# Patient Record
Sex: FEMALE | Race: WHITE | Employment: FULL TIME | URBAN - NONMETROPOLITAN AREA
[De-identification: names, ages, dates, MRNs, and addresses within clinical notes are randomized per-mention and may not be internally consistent; named-entity substitution may affect disease eponyms.]

---

## 2017-01-04 ENCOUNTER — OFFICE VISIT (OUTPATIENT)
Dept: FAMILY MEDICINE CLINIC | Age: 22
End: 2017-01-04

## 2017-01-04 VITALS
HEIGHT: 66 IN | RESPIRATION RATE: 15 BRPM | BODY MASS INDEX: 23.56 KG/M2 | HEART RATE: 76 BPM | DIASTOLIC BLOOD PRESSURE: 59 MMHG | WEIGHT: 146.6 LBS | SYSTOLIC BLOOD PRESSURE: 91 MMHG

## 2017-01-04 DIAGNOSIS — F07.81 POST CONCUSSION SYNDROME: Primary | ICD-10-CM

## 2017-01-04 DIAGNOSIS — G44.329 CHRONIC POST-TRAUMATIC HEADACHE, NOT INTRACTABLE: ICD-10-CM

## 2017-01-04 PROCEDURE — 99214 OFFICE O/P EST MOD 30 MIN: CPT | Performed by: FAMILY MEDICINE

## 2017-01-04 RX ORDER — TOPIRAMATE 50 MG/1
50 TABLET, FILM COATED ORAL 2 TIMES DAILY
Qty: 180 TABLET | Refills: 3 | Status: SHIPPED | OUTPATIENT
Start: 2017-01-04 | End: 2017-04-18

## 2017-01-04 ASSESSMENT — ENCOUNTER SYMPTOMS
NAUSEA: 0
WHEEZING: 0
VOMITING: 0
SHORTNESS OF BREATH: 0

## 2017-04-18 ENCOUNTER — OFFICE VISIT (OUTPATIENT)
Dept: FAMILY MEDICINE CLINIC | Age: 22
End: 2017-04-18

## 2017-04-18 VITALS
HEART RATE: 61 BPM | BODY MASS INDEX: 22.61 KG/M2 | SYSTOLIC BLOOD PRESSURE: 98 MMHG | HEIGHT: 66 IN | RESPIRATION RATE: 16 BRPM | DIASTOLIC BLOOD PRESSURE: 62 MMHG | WEIGHT: 140.7 LBS

## 2017-04-18 DIAGNOSIS — G44.329 CHRONIC POST-TRAUMATIC HEADACHE, NOT INTRACTABLE: ICD-10-CM

## 2017-04-18 DIAGNOSIS — F07.81 POST CONCUSSION SYNDROME: Primary | ICD-10-CM

## 2017-04-18 DIAGNOSIS — F41.9 ANXIETY: ICD-10-CM

## 2017-04-18 PROCEDURE — 99213 OFFICE O/P EST LOW 20 MIN: CPT | Performed by: FAMILY MEDICINE

## 2017-04-18 RX ORDER — TOPIRAMATE 100 MG/1
100 TABLET, FILM COATED ORAL 2 TIMES DAILY
Qty: 180 TABLET | Refills: 1 | Status: SHIPPED | OUTPATIENT
Start: 2017-04-18 | End: 2018-02-13

## 2017-04-18 RX ORDER — FLUOXETINE HYDROCHLORIDE 20 MG/1
20 CAPSULE ORAL DAILY
COMMUNITY
End: 2019-07-19 | Stop reason: ALTCHOICE

## 2017-04-18 RX ORDER — TIZANIDINE 4 MG/1
4 TABLET ORAL 3 TIMES DAILY
Qty: 30 TABLET | Refills: 0 | Status: SHIPPED | OUTPATIENT
Start: 2017-04-18 | End: 2018-02-13

## 2017-11-03 ENCOUNTER — HOSPITAL ENCOUNTER (EMERGENCY)
Age: 22
Discharge: HOME | End: 2017-11-03
Payer: COMMERCIAL

## 2017-11-03 ENCOUNTER — APPOINTMENT (OUTPATIENT)
Dept: GENERAL RADIOLOGY | Age: 22
End: 2017-11-03
Payer: COMMERCIAL

## 2017-11-03 VITALS
BODY MASS INDEX: 23.32 KG/M2 | RESPIRATION RATE: 20 BRPM | HEIGHT: 65 IN | SYSTOLIC BLOOD PRESSURE: 122 MMHG | HEART RATE: 92 BPM | OXYGEN SATURATION: 99 % | WEIGHT: 140 LBS | DIASTOLIC BLOOD PRESSURE: 76 MMHG | TEMPERATURE: 98.8 F

## 2017-11-03 DIAGNOSIS — J06.9 VIRAL URI WITH COUGH: ICD-10-CM

## 2017-11-03 DIAGNOSIS — J40 BRONCHITIS: Primary | ICD-10-CM

## 2017-11-03 PROCEDURE — 99285 EMERGENCY DEPT VISIT HI MDM: CPT

## 2017-11-03 PROCEDURE — 93005 ELECTROCARDIOGRAM TRACING: CPT

## 2017-11-03 PROCEDURE — 71020 XR CHEST STANDARD TWO VW: CPT

## 2017-11-03 RX ORDER — PSEUDOEPHEDRINE HYDROCHLORIDE 30 MG/1
30 TABLET ORAL EVERY 4 HOURS PRN
Qty: 20 TABLET | Refills: 1 | Status: SHIPPED | OUTPATIENT
Start: 2017-11-03 | End: 2017-11-10

## 2017-11-03 RX ORDER — PROMETHAZINE HYDROCHLORIDE AND CODEINE PHOSPHATE 6.25; 1 MG/5ML; MG/5ML
5 SYRUP ORAL 4 TIMES DAILY PRN
Qty: 140 ML | Refills: 0 | Status: SHIPPED | OUTPATIENT
Start: 2017-11-03 | End: 2017-11-10

## 2017-11-03 ASSESSMENT — PAIN SCALES - GENERAL: PAINLEVEL_OUTOF10: 8

## 2017-11-03 ASSESSMENT — ENCOUNTER SYMPTOMS
SHORTNESS OF BREATH: 1
VOMITING: 0
ABDOMINAL PAIN: 0
EYE PAIN: 0
SORE THROAT: 0
EYE DISCHARGE: 0
WHEEZING: 0
DIARRHEA: 0
BACK PAIN: 0
NAUSEA: 0
COUGH: 1
RHINORRHEA: 0

## 2017-11-03 ASSESSMENT — PAIN DESCRIPTION - PAIN TYPE: TYPE: ACUTE PAIN

## 2017-11-03 ASSESSMENT — PAIN DESCRIPTION - ONSET: ONSET: ON-GOING

## 2017-11-03 ASSESSMENT — PAIN DESCRIPTION - LOCATION: LOCATION: CHEST

## 2017-11-03 ASSESSMENT — PAIN DESCRIPTION - FREQUENCY: FREQUENCY: CONTINUOUS

## 2017-11-03 ASSESSMENT — PAIN DESCRIPTION - ORIENTATION: ORIENTATION: MID

## 2017-11-04 LAB
EKG ATRIAL RATE: 93 BPM
EKG P AXIS: 67 DEGREES
EKG P-R INTERVAL: 148 MS
EKG Q-T INTERVAL: 340 MS
EKG QRS DURATION: 78 MS
EKG QTC CALCULATION (BAZETT): 422 MS
EKG R AXIS: 77 DEGREES
EKG T AXIS: -37 DEGREES
EKG VENTRICULAR RATE: 93 BPM

## 2017-11-04 NOTE — ED PROVIDER NOTES
[facility]  eMERGENCY dEPARTMENT eNCOUnter          CHIEF COMPLAINT       Chief Complaint   Patient presents with    Chest Pain    URI     Dx bronchitis, Hard to breathe    Cough       Nurses Notes reviewed and I agree except as noted in the HPI. HISTORY OF PRESENT ILLNESS    Luz Yañez is a 23 y.o. female who presents to the Emergency Department for the evaluation of chest pain, onset 3 days ago. Her chest pain is rated an 8/10 in severity. She also has had an associated cough and shortness of breath. She was recently diagnosed with bronchitis 3 days ago and her doctor prescribed prednisone, inhaler, and a z-pack. She states her symptoms are not improving and now has chest pain. The patient denies any other symptoms at this time. REVIEW OF SYSTEMS     Review of Systems   Constitutional: Negative for appetite change, chills, fatigue and fever. HENT: Negative for congestion, ear pain, rhinorrhea and sore throat. Eyes: Negative for pain, discharge and visual disturbance. Respiratory: Positive for cough and shortness of breath. Negative for wheezing. Cardiovascular: Positive for chest pain. Negative for palpitations and leg swelling. Gastrointestinal: Negative for abdominal pain, diarrhea, nausea and vomiting. Genitourinary: Negative for difficulty urinating, dysuria and vaginal discharge. Musculoskeletal: Negative for arthralgias, back pain, joint swelling and neck pain. Skin: Negative for pallor and rash. Neurological: Negative for dizziness, syncope, weakness, light-headedness and headaches. Hematological: Negative for adenopathy. Psychiatric/Behavioral: Negative for confusion, dysphoric mood and suicidal ideas. The patient is not nervous/anxious. PAST MEDICAL HISTORY    has a past medical history of Fatigue; GERD (gastroesophageal reflux disease); Headache(784.0); Hormone disorder; and Septal defect.     SURGICAL HISTORY      has a past surgical history that includes shoulder surgery (Right); knee surgery (Right); Tonsillectomy; Knee arthroscopy; Shoulder arthroscopy; and Adenoidectomy. CURRENT MEDICATIONS       Previous Medications    ACETAMINOPHEN (APAP EXTRA STRENGTH) 500 MG TABLET    Take 2 tablets by mouth every 8 hours as needed for Pain. AMITRIPTYLINE (ELAVIL) 10 MG TABLET    Take 1 tablet by mouth nightly    BROMOCRIPTINE MESYLATE (PARLODEL PO)    Take 5 mg by mouth. FLUOXETINE (PROZAC) 20 MG CAPSULE    Take 20 mg by mouth daily    LANSOPRAZOLE (PREVACID) 30 MG CAPSULE    Take 30 mg by mouth daily. TIZANIDINE (ZANAFLEX) 4 MG TABLET    Take 1 tablet by mouth 3 times daily    TOPIRAMATE (TOPAMAX) 100 MG TABLET    Take 1 tablet by mouth 2 times daily       ALLERGIES     is allergic to latex and seasonal.    FAMILY HISTORY     indicated that the status of her maternal grandmother is unknown. She indicated that the status of her maternal grandfather is unknown. She indicated that the status of her paternal grandmother is unknown. She indicated that the status of her other is unknown.    family history includes Cancer in her maternal grandfather; Depression in her maternal grandmother; Diabetes in her maternal grandmother; High Blood Pressure in her paternal grandmother; Migraines in her paternal grandmother; Stroke in an other family member. SOCIAL HISTORY      reports that she has never smoked. She has never used smokeless tobacco. She reports that she does not drink alcohol or use drugs. PHYSICAL EXAM     INITIAL VITALS:  height is 5' 5\" (1.651 m) and weight is 140 lb (63.5 kg). Her oral temperature is 98.8 °F (37.1 °C). Her blood pressure is 122/76 and her pulse is 92. Her respiration is 20 and oxygen saturation is 99%. Physical Exam   Constitutional: She is oriented to person, place, and time. She appears well-developed and well-nourished. HENT:   Head: Normocephalic and atraumatic.    Right Ear: External ear normal.   Left Ear: External ear normal.   Eyes: Conjunctivae are normal. Right eye exhibits no discharge. Left eye exhibits no discharge. No scleral icterus. Neck: Normal range of motion. Neck supple. No JVD present. Mild erythema to the posterior pharynx. Prominent mucosa drainage down her posterior pharynx. Cardiovascular: Normal rate, regular rhythm and normal heart sounds. Exam reveals no gallop and no friction rub. No murmur heard. Pulmonary/Chest: Effort normal. No respiratory distress. She has no decreased breath sounds. She has no wheezes. She has rhonchi in the left upper field, the left middle field and the left lower field. She has no rales. Abdominal: Soft. She exhibits no distension. There is no tenderness. There is no rebound and no guarding. Musculoskeletal: Normal range of motion. She exhibits no edema. Neurological: She is alert and oriented to person, place, and time. She exhibits normal muscle tone. She displays no seizure activity. GCS eye subscore is 4. GCS verbal subscore is 5. GCS motor subscore is 6. Skin: Skin is warm and dry. No rash noted. She is not diaphoretic. Psychiatric: She has a normal mood and affect. Her behavior is normal. Thought content normal.   Nursing note and vitals reviewed. DIFFERENTIAL DIAGNOSIS:   Including but not limited to: viral URI, pneumothorax, pleurisy, bacterial bronchitis, or viral bronchitis. DIAGNOSTIC RESULTS     EKG: All EKG's are interpreted by the Emergency Department Physician who either signs or Co-signs this chart in the absence of a cardiologist.  EKG read and interpreted by myself with comparison to 26-APR-2013 gives impression of normal sinus rhythm with heart rate of 93; interval 148; QRS 78;QTc 422; axis 67, 77, -37.    Normal sinus rhythm  Possible Left atrial enlargement  T wave abnormality, consider inferior ischemia  T wave abnormality, consider anterior ischemia  No STEMI    RADIOLOGY: non-plain film images(s) such as CT, Ultrasound and MRI are read by the radiologist.  XR CHEST STANDARD (2 VW)   Final Result      No acute cardiopulmonary disease. **This report has been created using voice recognition software. It may contain minor errors which are inherent in voice recognition technology. **      Final report electronically signed by Dr. Ronnie Elliott on 11/3/2017 9:03 PM        LABS:     Labs Reviewed - No data to display    EMERGENCY DEPARTMENT COURSE:   Vitals:    Vitals:    11/03/17 2039   BP: 122/76   Pulse: 92   Resp: 20   Temp: 98.8 °F (37.1 °C)   TempSrc: Oral   SpO2: 99%   Weight: 140 lb (63.5 kg)   Height: 5' 5\" (1.651 m)       8:38 PM: The patient was seen and evaluated in a timely fashion. The patient was seen and evaluated within the ED today following chest pain. Within the department, I observed the patient's vital signs to be within acceptable range. On exam, I appreciated mild erythema to her posterior pharynx and prominent mucosa drainage down her posterior pharynx. Radiological studies within the department revealed no acute findings. I observed the patient's condition to remain stable during the duration of their stay. I explained my proposed course of treatment to the patient, and they were amenable to my decision. They will return to the ED if their symptoms become more severe in nature, or otherwise change. Medications - No data to display    CRITICAL CARE:   None. CONSULTS:   None. PROCEDURES:  None. FINAL IMPRESSION      1. Bronchitis    2. Viral URI with cough          DISPOSITION/PLAN     Discharged. I have given the patient strict written and verbal instructions about care at home, follow-up, and signs and symptoms of worsening of condition and they did verbalize understanding.     PATIENT REFERRED TO:  [id number] [facility] [id number]            DISCHARGE MEDICATIONS:  New Prescriptions    PROMETHAZINE-CODEINE (PHENERGAN WITH CODEINE) 6.25-10 MG/5ML SYRUP    Take

## 2018-02-13 ENCOUNTER — OFFICE VISIT (OUTPATIENT)
Dept: SURGERY | Age: 23
End: 2018-02-13
Payer: COMMERCIAL

## 2018-02-13 VITALS
WEIGHT: 144.5 LBS | OXYGEN SATURATION: 96 % | HEIGHT: 66 IN | DIASTOLIC BLOOD PRESSURE: 64 MMHG | HEART RATE: 64 BPM | SYSTOLIC BLOOD PRESSURE: 122 MMHG | RESPIRATION RATE: 18 BRPM | BODY MASS INDEX: 23.22 KG/M2 | TEMPERATURE: 99 F

## 2018-02-13 DIAGNOSIS — K21.9 GASTROESOPHAGEAL REFLUX DISEASE, ESOPHAGITIS PRESENCE NOT SPECIFIED: ICD-10-CM

## 2018-02-13 DIAGNOSIS — R10.11 RUQ PAIN: Primary | ICD-10-CM

## 2018-02-13 PROCEDURE — 99243 OFF/OP CNSLTJ NEW/EST LOW 30: CPT | Performed by: SURGERY

## 2018-02-13 RX ORDER — M-VIT,TX,IRON,MINS/CALC/FOLIC 27MG-0.4MG
1 TABLET ORAL DAILY
COMMUNITY

## 2018-02-13 ASSESSMENT — ENCOUNTER SYMPTOMS
VOICE CHANGE: 0
RHINORRHEA: 0
ANAL BLEEDING: 0
EYE DISCHARGE: 0
CHEST TIGHTNESS: 0
EYE ITCHING: 0
CONSTIPATION: 0
SORE THROAT: 0
FACIAL SWELLING: 0
COLOR CHANGE: 0
PHOTOPHOBIA: 0
COUGH: 0
SINUS PRESSURE: 0
VOMITING: 1
RECTAL PAIN: 0
ABDOMINAL DISTENTION: 0
ABDOMINAL PAIN: 1
BACK PAIN: 0
WHEEZING: 0
DIARRHEA: 0
EYE PAIN: 0
TROUBLE SWALLOWING: 0
STRIDOR: 0
SHORTNESS OF BREATH: 0
EYE REDNESS: 0
APNEA: 0
BLOOD IN STOOL: 0
CHOKING: 0
NAUSEA: 1

## 2018-02-13 NOTE — LETTER
[facility]  Marlyn Coates. Mandeep Crowley  Phone- [phone]  Fax [phone]    Pt Name: Ede Caldwell Record Number: [id number]  Date of Birth 1995   Today's Date: 2/13/2018    Richard Lau was seen in consultation in the office today. My assessment and plans are listed below. ASSESSMENT        1. RUQ pain  NM Carminaa HAYLEY Ckk Kinevac   2. Gastroesophageal reflux disease, esophagitis presence not specified       Past Medical History:   Diagnosis Date    Fatigue     GERD (gastroesophageal reflux disease)     Headache(784.0)     Hormone disorder     over active prolactin hormone    Septal defect      PLANS:       Trial of Prilosec  Orders Placed This Encounter   Procedures    NM Hida W Ckk Kinevac   Follow up after testing completed   If I can provide any additional assistance or you have any concerns, please feel free to contact me. Thank you for allowing to participate in the care of your patients. Sincerely,      Marlyn Coates.  Mandeep Nelson

## 2018-02-13 NOTE — PROGRESS NOTES
Subjective:      Patient ID: Chriss Brar is a 23 y.o. female. Chief Complaint   Patient presents with    Surgical Consult     New Pt. Gall Bladder Disease- okay per Omar       HPI    Review of Systems   Constitutional: Negative for activity change, appetite change, chills, diaphoresis, fatigue, fever and unexpected weight change. HENT: Negative for congestion, dental problem, drooling, ear discharge, ear pain, facial swelling, hearing loss, mouth sores, nosebleeds, postnasal drip, rhinorrhea, sinus pressure, sneezing, sore throat, tinnitus, trouble swallowing and voice change. Eyes: Negative for photophobia, pain, discharge, redness, itching and visual disturbance. Respiratory: Negative for apnea, cough, choking, chest tightness, shortness of breath, wheezing and stridor. Cardiovascular: Negative for chest pain, palpitations and leg swelling. Gastrointestinal: Positive for abdominal pain, nausea and vomiting. Negative for abdominal distention, anal bleeding, blood in stool, constipation, diarrhea and rectal pain. Endocrine: Negative for cold intolerance, heat intolerance, polydipsia, polyphagia and polyuria. Genitourinary: Negative for decreased urine volume, difficulty urinating, dysuria, enuresis, flank pain, frequency, genital sores, hematuria and urgency. Musculoskeletal: Negative for arthralgias, back pain, gait problem, joint swelling, myalgias, neck pain and neck stiffness. Skin: Negative for color change, pallor, rash and wound. Allergic/Immunologic: Negative for environmental allergies, food allergies and immunocompromised state. Neurological: Negative for dizziness, tremors, seizures, syncope, facial asymmetry, speech difficulty, weakness, light-headedness, numbness and headaches. Hematological: Negative for adenopathy. Does not bruise/bleed easily.    Psychiatric/Behavioral: Negative for agitation, behavioral problems, confusion, decreased concentration, dysphoric mood,

## 2018-02-13 NOTE — PATIENT INSTRUCTIONS
hida scan scheduled for Feb 23rd arrive at 1230pm. Main radiology- 1st floor. NPO 6 hours prior. No pain medication.

## 2018-02-26 ENCOUNTER — HOSPITAL ENCOUNTER (OUTPATIENT)
Dept: NUCLEAR MEDICINE | Age: 23
Discharge: HOME | End: 2018-02-26
Payer: COMMERCIAL

## 2018-02-26 VITALS — HEIGHT: 66 IN | WEIGHT: 140 LBS | BODY MASS INDEX: 22.5 KG/M2

## 2018-02-26 DIAGNOSIS — R10.11 ABDOMINAL PAIN, RIGHT UPPER QUADRANT: ICD-10-CM

## 2018-02-26 PROCEDURE — A9537 TC99M MEBROFENIN: HCPCS | Performed by: SURGERY

## 2018-02-26 PROCEDURE — 2580000003 HC RX 258: Performed by: SURGERY

## 2018-02-26 PROCEDURE — 3430000000 HC RX DIAGNOSTIC RADIOPHARMACEUTICAL: Performed by: SURGERY

## 2018-02-26 PROCEDURE — 6360000004 HC RX CONTRAST MEDICATION: Performed by: SURGERY

## 2018-02-26 PROCEDURE — 78227 HEPATOBIL SYST IMAGE W/DRUG: CPT

## 2018-02-26 RX ADMIN — SODIUM CHLORIDE 1.27 MCG: 9 INJECTION, SOLUTION INTRAVENOUS at 07:38

## 2018-02-26 RX ADMIN — Medication 8.1 MILLICURIE: at 07:38

## 2018-02-26 NOTE — PROGRESS NOTES
[facility]  Established Patient Evaluation in Office  Pt Name: Ash Walker  Date of Birth 1995   Today's Date: 2/27/2018  Medical Record Number: [id number]  Referring Provider: No ref. provider found  Primary Care Provider: BRAN Stover  Chief Complaint   Patient presents with    Results     HIDA Scan 2/26/18     ASSESSMENT      1. RUQ pain  LAPAROSCOPY CHOLECYSTECTOMY    Hepatic Function Panel   2. Gastroesophageal reflux disease, esophagitis presence not specified  LAPAROSCOPY CHOLECYSTECTOMY    Hepatic Function Panel   3. Chronic cholecystitis  LAPAROSCOPY CHOLECYSTECTOMY    Hepatic Function Panel     Past Medical History:   Diagnosis Date    Fatigue     GERD (gastroesophageal reflux disease)     Headache(784.0)     Hormone disorder     over active prolactin hormone    Septal defect       PLANS       1. Mardella Oppenheim had no improvement with trial of different antacids  2. CCK with HIDA did reproduce her symptoms  3. Schedule Yajaira for L/S cholecystectomy possible open  4. The risks, options and alternatives were discussed at length with the patient. The risks including bleeding, infection, reoperation, bile duct injury and possible conversion to an open procedure were covered as well as nonresolution of pain. The possibility of other unforeseen complications including bile leak were also mentioned. Patient was made aware of intraoperative complications such as other organ injury or injury to surrounding structures. We also discussed the conduct of the operation, probable outpatient stay postoperatively and the typical post operative recovery and restrictions. The patients questions were answered. After this discussion, the patient would like to proceed with cholecystectomy. 5. Status: Outpatient  6. Planned anesthesia: general  7.  She will undergo pre-operative clearance per anesthesia guidelines with risk factors listed under the past medical history facility-administered medications for this visit. Allergies  is allergic to latex; seasonal; and chloraprep one step [chlorhexidine gluconate]. Family History  family history includes Cancer in her maternal grandfather; Depression in her maternal grandmother; Diabetes in her maternal grandmother; High Blood Pressure in her paternal grandmother; Migraines in her paternal grandmother; Stroke in an other family member. Social History   reports that she has never smoked. She has never used smokeless tobacco. She reports that she does not drink alcohol or use drugs. Health Screening Exams  Health Maintenance   Topic Date Due    HIV screen  06/03/2012    Meningococcal (MCV) Vaccine Age 0-22 Years (1 of 1) 06/03/2013    Chlamydia screen  06/03/2013    DTaP/Tdap/Td vaccine (1 - Tdap) 06/03/2016    Flu vaccine (1) 09/01/2017     Review of Systems  Constitutional: Negative for activity change, appetite change, chills, diaphoresis, fatigue, fever and unexpected weight change. HENT: Negative for congestion, dental problem, drooling, ear discharge, ear pain, facial swelling, hearing loss, mouth sores, nosebleeds, postnasal drip, rhinorrhea, sinus pressure, sneezing, sore throat, tinnitus, trouble swallowing and voice change. Eyes: Negative for photophobia, pain, discharge, redness, itching and visual disturbance. Respiratory: Negative for apnea, cough, choking, chest tightness, shortness of breath, wheezing and stridor. Cardiovascular: Negative for chest pain, palpitations and leg swelling. Gastrointestinal: Positive for abdominal pain, nausea and vomiting. Negative for abdominal distention, anal bleeding, blood in stool, constipation, diarrhea and rectal pain. Endocrine: Negative for cold intolerance, heat intolerance, polydipsia, polyphagia and polyuria.    Genitourinary: Negative for decreased urine volume, difficulty urinating, dysuria, enuresis, flank pain, frequency, genital sores, hematuria and

## 2018-02-27 ENCOUNTER — OFFICE VISIT (OUTPATIENT)
Dept: SURGERY | Age: 23
End: 2018-02-27
Payer: COMMERCIAL

## 2018-02-27 VITALS
DIASTOLIC BLOOD PRESSURE: 68 MMHG | SYSTOLIC BLOOD PRESSURE: 106 MMHG | TEMPERATURE: 97.8 F | RESPIRATION RATE: 16 BRPM | HEART RATE: 76 BPM

## 2018-02-27 DIAGNOSIS — R10.11 RUQ PAIN: Primary | ICD-10-CM

## 2018-02-27 DIAGNOSIS — K21.9 GASTROESOPHAGEAL REFLUX DISEASE, ESOPHAGITIS PRESENCE NOT SPECIFIED: ICD-10-CM

## 2018-02-27 DIAGNOSIS — K81.1 CHRONIC CHOLECYSTITIS: ICD-10-CM

## 2018-02-27 PROCEDURE — 99214 OFFICE O/P EST MOD 30 MIN: CPT | Performed by: SURGERY

## 2018-02-27 NOTE — LETTER
If I can provide any additional assistance or you have any concerns, please feel free to contact me. Thank you for allowing to participate in the care of your patients. Sincerely,      Karl Staples.  Mandeep Nelson

## 2018-02-27 NOTE — PATIENT INSTRUCTIONS
Patient Education        Cholecystectomy: Before Your Surgery  What is cholecystectomy? Cholecystectomy (jy-ppr-boe-KATIE-tuh-shiv) is a type of surgery. It removes a diseased gallbladder. This surgery is usually done as a laparoscopic surgery. The doctor puts a lighted tube and other surgical tools through small cuts (incisions) in your belly. The tube is called a scope. It lets your doctor see your organs so he or she can do the surgery. The incisions leave scars that fade with time. Most people go home the same day. You probably will feel better each day. Most people have only a small amount of pain after 1 week. If you have a desk job, you can probably go back to work in 1 to 2 weeks. If you lift heavy objects or have a very active job, it may take up to 4 weeks. In some cases, open surgery is the best choice. Your doctor may choose open surgery in advance. Or he or she may choose it in the middle of laparoscopic surgery. In open surgery, the doctor makes a larger incision in your upper belly. If you have open surgery, you will probably stay in the hospital for 2 to 4 days. And it may take 4 to 6 weeks to get back to your normal routine. Follow-up care is a key part of your treatment and safety. Be sure to make and go to all appointments, and call your doctor if you are having problems. It's also a good idea to know your test results and keep a list of the medicines you take. What happens before surgery? ?Surgery can be stressful. This information will help you understand what you can expect. And it will help you safely prepare for surgery. ? Preparing for surgery  ? · Understand exactly what surgery is planned, along with the risks, benefits, and other options. · Tell your doctors ALL the medicines, vitamins, supplements, and herbal remedies you take. Some of these can increase the risk of bleeding or interact with anesthesia. ?  · If you take blood thinners, such as warfarin (Coumadin), clopidogrel

## 2018-02-28 LAB
ALBUMIN SERPL-MCNC: 4.7 G/DL (ref 3.5–5.2)
ALK PHOSPHATASE: 43 U/L (ref 38–148)
ALT SERPL-CCNC: 10 U/L (ref 5–40)
AST SERPL-CCNC: 20 U/L (ref 9–40)
BILIRUB SERPL-MCNC: 0.2 MG/DL
BILIRUBIN DIRECT: <0.2 MG/DL
TOTAL PROTEIN: 6.9 G/DL (ref 6.1–8.3)

## 2018-03-02 ENCOUNTER — TELEPHONE (OUTPATIENT)
Dept: SURGERY | Age: 23
End: 2018-03-02

## 2018-03-08 ENCOUNTER — ANESTHESIA EVENT (OUTPATIENT)
Dept: OPERATING ROOM | Age: 23
DRG: 417 | End: 2018-03-08
Payer: COMMERCIAL

## 2018-03-08 ENCOUNTER — APPOINTMENT (OUTPATIENT)
Dept: GENERAL RADIOLOGY | Age: 23
DRG: 417 | End: 2018-03-08
Attending: SURGERY
Payer: COMMERCIAL

## 2018-03-08 ENCOUNTER — ANESTHESIA (OUTPATIENT)
Dept: OPERATING ROOM | Age: 23
DRG: 417 | End: 2018-03-08
Payer: COMMERCIAL

## 2018-03-08 ENCOUNTER — HOSPITAL ENCOUNTER (INPATIENT)
Age: 23
LOS: 2 days | Discharge: HOME | DRG: 417 | End: 2018-03-10
Attending: SURGERY | Admitting: SURGERY
Payer: COMMERCIAL

## 2018-03-08 VITALS
SYSTOLIC BLOOD PRESSURE: 95 MMHG | TEMPERATURE: 97.5 F | RESPIRATION RATE: 11 BRPM | OXYGEN SATURATION: 95 % | DIASTOLIC BLOOD PRESSURE: 51 MMHG

## 2018-03-08 DIAGNOSIS — G89.18 ACUTE POSTOPERATIVE PAIN: Primary | ICD-10-CM

## 2018-03-08 PROBLEM — R09.02 HYPOXIA: Status: ACTIVE | Noted: 2018-03-08

## 2018-03-08 LAB
BASOPHILS # BLD: 0.1 %
BASOPHILS ABSOLUTE: 0 THOU/MM3 (ref 0–0.1)
EOSINOPHIL # BLD: 0 %
EOSINOPHILS ABSOLUTE: 0 THOU/MM3 (ref 0–0.4)
HCT VFR BLD CALC: 35.3 % (ref 37–47)
HEMOGLOBIN: 12.1 GM/DL (ref 12–16)
LYMPHOCYTES # BLD: 5.8 %
LYMPHOCYTES ABSOLUTE: 0.7 THOU/MM3 (ref 1–4.8)
MCH RBC QN AUTO: 30.7 PG (ref 27–31)
MCHC RBC AUTO-ENTMCNC: 34.1 GM/DL (ref 33–37)
MCV RBC AUTO: 89.8 FL (ref 81–99)
MONOCYTES # BLD: 2.1 %
MONOCYTES ABSOLUTE: 0.3 THOU/MM3 (ref 0.4–1.3)
NUCLEATED RED BLOOD CELLS: 0 /100 WBC
PDW BLD-RTO: 12.5 % (ref 11.5–14.5)
PLATELET # BLD: 223 THOU/MM3 (ref 130–400)
PMV BLD AUTO: 7.4 FL (ref 7.4–10.4)
PREGNANCY, URINE: NEGATIVE
RBC # BLD: 3.93 MILL/MM3 (ref 4.2–5.4)
SEG NEUTROPHILS: 92 %
SEGMENTED NEUTROPHILS ABSOLUTE COUNT: 11.3 THOU/MM3 (ref 1.8–7.7)
WBC # BLD: 12.3 THOU/MM3 (ref 4.8–10.8)

## 2018-03-08 PROCEDURE — 3600000003 HC SURGERY LEVEL 3 BASE: Performed by: SURGERY

## 2018-03-08 PROCEDURE — 2500000003 HC RX 250 WO HCPCS: Performed by: NURSE ANESTHETIST, CERTIFIED REGISTERED

## 2018-03-08 PROCEDURE — 6360000002 HC RX W HCPCS: Performed by: ANESTHESIOLOGY

## 2018-03-08 PROCEDURE — 71045 X-RAY EXAM CHEST 1 VIEW: CPT

## 2018-03-08 PROCEDURE — G0378 HOSPITAL OBSERVATION PER HR: HCPCS

## 2018-03-08 PROCEDURE — 6360000002 HC RX W HCPCS: Performed by: SURGERY

## 2018-03-08 PROCEDURE — 0FT44ZZ RESECTION OF GALLBLADDER, PERCUTANEOUS ENDOSCOPIC APPROACH: ICD-10-PCS | Performed by: SURGERY

## 2018-03-08 PROCEDURE — 2500000003 HC RX 250 WO HCPCS: Performed by: SURGERY

## 2018-03-08 PROCEDURE — 3700000000 HC ANESTHESIA ATTENDED CARE: Performed by: SURGERY

## 2018-03-08 PROCEDURE — 7100000011 HC PHASE II RECOVERY - ADDTL 15 MIN: Performed by: SURGERY

## 2018-03-08 PROCEDURE — 6360000002 HC RX W HCPCS: Performed by: NURSE ANESTHETIST, CERTIFIED REGISTERED

## 2018-03-08 PROCEDURE — 3600000013 HC SURGERY LEVEL 3 ADDTL 15MIN: Performed by: SURGERY

## 2018-03-08 PROCEDURE — 6370000000 HC RX 637 (ALT 250 FOR IP): Performed by: ANESTHESIOLOGY

## 2018-03-08 PROCEDURE — 3700000001 HC ADD 15 MINUTES (ANESTHESIA): Performed by: SURGERY

## 2018-03-08 PROCEDURE — 7100000010 HC PHASE II RECOVERY - FIRST 15 MIN: Performed by: SURGERY

## 2018-03-08 PROCEDURE — 7100000000 HC PACU RECOVERY - FIRST 15 MIN: Performed by: SURGERY

## 2018-03-08 PROCEDURE — 47562 LAPAROSCOPIC CHOLECYSTECTOMY: CPT | Performed by: SURGERY

## 2018-03-08 PROCEDURE — 6360000002 HC RX W HCPCS

## 2018-03-08 PROCEDURE — 6370000000 HC RX 637 (ALT 250 FOR IP): Performed by: SURGERY

## 2018-03-08 PROCEDURE — 2580000003 HC RX 258: Performed by: SURGERY

## 2018-03-08 PROCEDURE — 85025 COMPLETE CBC W/AUTO DIFF WBC: CPT

## 2018-03-08 PROCEDURE — 36415 COLL VENOUS BLD VENIPUNCTURE: CPT

## 2018-03-08 PROCEDURE — 1200000003 HC TELEMETRY R&B

## 2018-03-08 PROCEDURE — 88304 TISSUE EXAM BY PATHOLOGIST: CPT

## 2018-03-08 PROCEDURE — 81025 URINE PREGNANCY TEST: CPT

## 2018-03-08 PROCEDURE — 7100000001 HC PACU RECOVERY - ADDTL 15 MIN: Performed by: SURGERY

## 2018-03-08 RX ORDER — MEPERIDINE HYDROCHLORIDE 25 MG/ML
12.5 INJECTION INTRAMUSCULAR; INTRAVENOUS; SUBCUTANEOUS EVERY 5 MIN PRN
Status: DISCONTINUED | OUTPATIENT
Start: 2018-03-08 | End: 2018-03-08 | Stop reason: HOSPADM

## 2018-03-08 RX ORDER — ROCURONIUM BROMIDE 10 MG/ML
INJECTION, SOLUTION INTRAVENOUS PRN
Status: DISCONTINUED | OUTPATIENT
Start: 2018-03-08 | End: 2018-03-08 | Stop reason: SDUPTHER

## 2018-03-08 RX ORDER — NEOSTIGMINE METHYLSULFATE 5 MG/5 ML
SYRINGE (ML) INTRAVENOUS PRN
Status: DISCONTINUED | OUTPATIENT
Start: 2018-03-08 | End: 2018-03-08 | Stop reason: SDUPTHER

## 2018-03-08 RX ORDER — SUCCINYLCHOLINE CHLORIDE 20 MG/ML
INJECTION INTRAMUSCULAR; INTRAVENOUS PRN
Status: DISCONTINUED | OUTPATIENT
Start: 2018-03-08 | End: 2018-03-08 | Stop reason: SDUPTHER

## 2018-03-08 RX ORDER — LIDOCAINE HYDROCHLORIDE 20 MG/ML
INJECTION, SOLUTION EPIDURAL; INFILTRATION; INTRACAUDAL; PERINEURAL PRN
Status: DISCONTINUED | OUTPATIENT
Start: 2018-03-08 | End: 2018-03-08 | Stop reason: SDUPTHER

## 2018-03-08 RX ORDER — PROMETHAZINE HYDROCHLORIDE 12.5 MG/1
12.5 TABLET ORAL EVERY 6 HOURS PRN
Qty: 20 TABLET | Refills: 0 | Status: SHIPPED | OUTPATIENT
Start: 2018-03-08 | End: 2018-03-15

## 2018-03-08 RX ORDER — MIDAZOLAM HYDROCHLORIDE 1 MG/ML
INJECTION INTRAMUSCULAR; INTRAVENOUS PRN
Status: DISCONTINUED | OUTPATIENT
Start: 2018-03-08 | End: 2018-03-08 | Stop reason: SDUPTHER

## 2018-03-08 RX ORDER — HYDROCODONE BITARTRATE AND ACETAMINOPHEN 5; 325 MG/1; MG/1
1 TABLET ORAL EVERY 4 HOURS PRN
Status: DISCONTINUED | OUTPATIENT
Start: 2018-03-08 | End: 2018-03-10 | Stop reason: HOSPADM

## 2018-03-08 RX ORDER — ONDANSETRON 2 MG/ML
4 INJECTION INTRAMUSCULAR; INTRAVENOUS EVERY 6 HOURS PRN
Status: DISCONTINUED | OUTPATIENT
Start: 2018-03-08 | End: 2018-03-10 | Stop reason: HOSPADM

## 2018-03-08 RX ORDER — KETOROLAC TROMETHAMINE 30 MG/ML
INJECTION, SOLUTION INTRAMUSCULAR; INTRAVENOUS
Status: COMPLETED
Start: 2018-03-08 | End: 2018-03-08

## 2018-03-08 RX ORDER — PROPOFOL 10 MG/ML
INJECTION, EMULSION INTRAVENOUS PRN
Status: DISCONTINUED | OUTPATIENT
Start: 2018-03-08 | End: 2018-03-08 | Stop reason: SDUPTHER

## 2018-03-08 RX ORDER — HYDROCODONE BITARTRATE AND ACETAMINOPHEN 5; 325 MG/1; MG/1
2 TABLET ORAL EVERY 4 HOURS PRN
Status: DISCONTINUED | OUTPATIENT
Start: 2018-03-08 | End: 2018-03-10 | Stop reason: HOSPADM

## 2018-03-08 RX ORDER — ONDANSETRON 2 MG/ML
INJECTION INTRAMUSCULAR; INTRAVENOUS PRN
Status: DISCONTINUED | OUTPATIENT
Start: 2018-03-08 | End: 2018-03-08 | Stop reason: SDUPTHER

## 2018-03-08 RX ORDER — FENTANYL CITRATE 50 UG/ML
50 INJECTION, SOLUTION INTRAMUSCULAR; INTRAVENOUS EVERY 5 MIN PRN
Status: DISCONTINUED | OUTPATIENT
Start: 2018-03-08 | End: 2018-03-08

## 2018-03-08 RX ORDER — DEXAMETHASONE SODIUM PHOSPHATE 4 MG/ML
INJECTION, SOLUTION INTRA-ARTICULAR; INTRALESIONAL; INTRAMUSCULAR; INTRAVENOUS; SOFT TISSUE PRN
Status: DISCONTINUED | OUTPATIENT
Start: 2018-03-08 | End: 2018-03-08 | Stop reason: SDUPTHER

## 2018-03-08 RX ORDER — KETOROLAC TROMETHAMINE 30 MG/ML
30 INJECTION, SOLUTION INTRAMUSCULAR; INTRAVENOUS EVERY 6 HOURS PRN
Status: DISCONTINUED | OUTPATIENT
Start: 2018-03-08 | End: 2018-03-10 | Stop reason: HOSPADM

## 2018-03-08 RX ORDER — KETAMINE HYDROCHLORIDE 50 MG/ML
INJECTION, SOLUTION, CONCENTRATE INTRAMUSCULAR; INTRAVENOUS PRN
Status: DISCONTINUED | OUTPATIENT
Start: 2018-03-08 | End: 2018-03-08 | Stop reason: SDUPTHER

## 2018-03-08 RX ORDER — BUPIVACAINE HYDROCHLORIDE AND EPINEPHRINE 5; 5 MG/ML; UG/ML
INJECTION, SOLUTION EPIDURAL; INTRACAUDAL; PERINEURAL PRN
Status: DISCONTINUED | OUTPATIENT
Start: 2018-03-08 | End: 2018-03-08

## 2018-03-08 RX ORDER — GLYCOPYRROLATE 1 MG/5 ML
SYRINGE (ML) INTRAVENOUS PRN
Status: DISCONTINUED | OUTPATIENT
Start: 2018-03-08 | End: 2018-03-08 | Stop reason: SDUPTHER

## 2018-03-08 RX ORDER — RANITIDINE 150 MG/1
150 TABLET ORAL DAILY PRN
COMMUNITY
End: 2019-05-03 | Stop reason: ALTCHOICE

## 2018-03-08 RX ORDER — FENTANYL CITRATE 50 UG/ML
INJECTION, SOLUTION INTRAMUSCULAR; INTRAVENOUS PRN
Status: DISCONTINUED | OUTPATIENT
Start: 2018-03-08 | End: 2018-03-08 | Stop reason: SDUPTHER

## 2018-03-08 RX ORDER — SODIUM CHLORIDE 0.9 % (FLUSH) 0.9 %
10 SYRINGE (ML) INJECTION PRN
Status: DISCONTINUED | OUTPATIENT
Start: 2018-03-08 | End: 2018-03-08 | Stop reason: HOSPADM

## 2018-03-08 RX ORDER — CALCIUM CARBONATE 200(500)MG
1 TABLET,CHEWABLE ORAL
COMMUNITY
End: 2021-10-13

## 2018-03-08 RX ORDER — ONDANSETRON 2 MG/ML
4 INJECTION INTRAMUSCULAR; INTRAVENOUS
Status: DISCONTINUED | OUTPATIENT
Start: 2018-03-08 | End: 2018-03-08 | Stop reason: HOSPADM

## 2018-03-08 RX ORDER — SODIUM CHLORIDE 0.9 % (FLUSH) 0.9 %
10 SYRINGE (ML) INJECTION EVERY 12 HOURS SCHEDULED
Status: DISCONTINUED | OUTPATIENT
Start: 2018-03-08 | End: 2018-03-08 | Stop reason: HOSPADM

## 2018-03-08 RX ORDER — KETOROLAC TROMETHAMINE 30 MG/ML
30 INJECTION, SOLUTION INTRAMUSCULAR; INTRAVENOUS ONCE
Status: COMPLETED | OUTPATIENT
Start: 2018-03-08 | End: 2018-03-08

## 2018-03-08 RX ORDER — HYDROCODONE BITARTRATE AND ACETAMINOPHEN 5; 325 MG/1; MG/1
1 TABLET ORAL EVERY 4 HOURS PRN
Qty: 40 TABLET | Refills: 0 | Status: SHIPPED | OUTPATIENT
Start: 2018-03-08 | End: 2018-03-15

## 2018-03-08 RX ORDER — SODIUM CHLORIDE 9 MG/ML
INJECTION, SOLUTION INTRAVENOUS CONTINUOUS
Status: DISCONTINUED | OUTPATIENT
Start: 2018-03-08 | End: 2018-03-10 | Stop reason: HOSPADM

## 2018-03-08 RX ADMIN — HYDROMORPHONE HYDROCHLORIDE 0.25 MG: 1 INJECTION, SOLUTION INTRAMUSCULAR; INTRAVENOUS; SUBCUTANEOUS at 14:42

## 2018-03-08 RX ADMIN — Medication 3.3 MG: at 16:31

## 2018-03-08 RX ADMIN — KETOROLAC TROMETHAMINE 30 MG: 30 INJECTION, SOLUTION INTRAMUSCULAR at 21:13

## 2018-03-08 RX ADMIN — HYDROCODONE BITARTRATE AND ACETAMINOPHEN 2 TABLET: 5; 325 TABLET ORAL at 19:51

## 2018-03-08 RX ADMIN — KETAMINE HYDROCHLORIDE 25 MG: 50 INJECTION, SOLUTION INTRAMUSCULAR; INTRAVENOUS at 12:32

## 2018-03-08 RX ADMIN — ONDANSETRON 4 MG: 2 INJECTION INTRAMUSCULAR; INTRAVENOUS at 17:58

## 2018-03-08 RX ADMIN — FENTANYL CITRATE 50 MCG: 50 INJECTION, SOLUTION INTRAMUSCULAR; INTRAVENOUS at 14:13

## 2018-03-08 RX ADMIN — PROPOFOL 150 MG: 10 INJECTION, EMULSION INTRAVENOUS at 12:32

## 2018-03-08 RX ADMIN — ONDANSETRON 4 MG: 2 INJECTION INTRAMUSCULAR; INTRAVENOUS at 12:40

## 2018-03-08 RX ADMIN — HYDROMORPHONE HYDROCHLORIDE 0.5 MG: 1 INJECTION, SOLUTION INTRAMUSCULAR; INTRAVENOUS; SUBCUTANEOUS at 22:32

## 2018-03-08 RX ADMIN — KETOROLAC TROMETHAMINE 30 MG: 30 INJECTION, SOLUTION INTRAMUSCULAR at 14:23

## 2018-03-08 RX ADMIN — SODIUM CHLORIDE: 9 INJECTION, SOLUTION INTRAVENOUS at 12:07

## 2018-03-08 RX ADMIN — MIDAZOLAM HYDROCHLORIDE 2 MG: 1 INJECTION, SOLUTION INTRAMUSCULAR; INTRAVENOUS at 12:28

## 2018-03-08 RX ADMIN — Medication 0.6 MG: at 12:54

## 2018-03-08 RX ADMIN — LIDOCAINE HYDROCHLORIDE 80 MG: 20 INJECTION, SOLUTION EPIDURAL; INFILTRATION; INTRACAUDAL; PERINEURAL at 12:32

## 2018-03-08 RX ADMIN — SODIUM CHLORIDE: 9 INJECTION, SOLUTION INTRAVENOUS at 22:20

## 2018-03-08 RX ADMIN — SUCCINYLCHOLINE CHLORIDE 20 MG: 20 INJECTION, SOLUTION INTRAMUSCULAR; INTRAVENOUS at 13:07

## 2018-03-08 RX ADMIN — HYDROMORPHONE HYDROCHLORIDE 0.25 MG: 1 INJECTION, SOLUTION INTRAMUSCULAR; INTRAVENOUS; SUBCUTANEOUS at 14:37

## 2018-03-08 RX ADMIN — DEXAMETHASONE SODIUM PHOSPHATE 4 MG: 4 INJECTION, SOLUTION INTRAMUSCULAR; INTRAVENOUS at 12:40

## 2018-03-08 RX ADMIN — Medication 0.2 MG: at 12:32

## 2018-03-08 RX ADMIN — FENTANYL CITRATE 50 MCG: 50 INJECTION INTRAMUSCULAR; INTRAVENOUS at 12:41

## 2018-03-08 RX ADMIN — Medication 3 MG: at 12:54

## 2018-03-08 RX ADMIN — FENTANYL CITRATE 50 MCG: 50 INJECTION, SOLUTION INTRAMUSCULAR; INTRAVENOUS at 14:18

## 2018-03-08 RX ADMIN — HYDROCODONE BITARTRATE AND ACETAMINOPHEN 1 TABLET: 5; 325 TABLET ORAL at 15:35

## 2018-03-08 RX ADMIN — KETOROLAC TROMETHAMINE 30 MG: 30 INJECTION, SOLUTION INTRAMUSCULAR; INTRAVENOUS at 21:13

## 2018-03-08 RX ADMIN — Medication 20 MG: at 12:33

## 2018-03-08 RX ADMIN — KETOROLAC TROMETHAMINE 30 MG: 30 INJECTION, SOLUTION INTRAMUSCULAR; INTRAVENOUS at 14:23

## 2018-03-08 RX ADMIN — PROPOFOL 20 MG: 10 INJECTION, EMULSION INTRAVENOUS at 12:41

## 2018-03-08 RX ADMIN — CEFOXITIN 2 G: 2 INJECTION, POWDER, FOR SOLUTION INTRAVENOUS at 12:38

## 2018-03-08 RX ADMIN — FENTANYL CITRATE 50 MCG: 50 INJECTION INTRAMUSCULAR; INTRAVENOUS at 12:31

## 2018-03-08 ASSESSMENT — PULMONARY FUNCTION TESTS
PIF_VALUE: 17
PIF_VALUE: 18
PIF_VALUE: 12
PIF_VALUE: 21
PIF_VALUE: 17
PIF_VALUE: 14
PIF_VALUE: 17
PIF_VALUE: 14
PIF_VALUE: 16
PIF_VALUE: 1
PIF_VALUE: 16
PIF_VALUE: 16
PIF_VALUE: 23
PIF_VALUE: 17
PIF_VALUE: 16
PIF_VALUE: 1
PIF_VALUE: 26
PIF_VALUE: 19
PIF_VALUE: 12
PIF_VALUE: 14
PIF_VALUE: 15
PIF_VALUE: 2
PIF_VALUE: 0
PIF_VALUE: 1
PIF_VALUE: 4
PIF_VALUE: 11
PIF_VALUE: 14
PIF_VALUE: 13
PIF_VALUE: 1
PIF_VALUE: 17
PIF_VALUE: 15
PIF_VALUE: 15
PIF_VALUE: 2
PIF_VALUE: 19
PIF_VALUE: 26
PIF_VALUE: 14
PIF_VALUE: 16
PIF_VALUE: 15
PIF_VALUE: 1
PIF_VALUE: 17
PIF_VALUE: 16
PIF_VALUE: 15
PIF_VALUE: 12
PIF_VALUE: 0
PIF_VALUE: 2
PIF_VALUE: 2
PIF_VALUE: 12
PIF_VALUE: 15
PIF_VALUE: 1
PIF_VALUE: 21
PIF_VALUE: 4
PIF_VALUE: 27

## 2018-03-08 ASSESSMENT — PAIN SCALES - WONG BAKER: WONGBAKER_NUMERICALRESPONSE: 0

## 2018-03-08 ASSESSMENT — PAIN DESCRIPTION - LOCATION
LOCATION: ABDOMEN

## 2018-03-08 ASSESSMENT — PAIN DESCRIPTION - PROGRESSION
CLINICAL_PROGRESSION: NOT CHANGED
CLINICAL_PROGRESSION: NOT CHANGED
CLINICAL_PROGRESSION: GRADUALLY WORSENING
CLINICAL_PROGRESSION: GRADUALLY IMPROVING
CLINICAL_PROGRESSION: NOT CHANGED

## 2018-03-08 ASSESSMENT — PAIN SCALES - GENERAL
PAINLEVEL_OUTOF10: 9
PAINLEVEL_OUTOF10: 8
PAINLEVEL_OUTOF10: 9
PAINLEVEL_OUTOF10: 6
PAINLEVEL_OUTOF10: 8
PAINLEVEL_OUTOF10: 8
PAINLEVEL_OUTOF10: 9
PAINLEVEL_OUTOF10: 4
PAINLEVEL_OUTOF10: 7
PAINLEVEL_OUTOF10: 0
PAINLEVEL_OUTOF10: 6

## 2018-03-08 ASSESSMENT — PAIN DESCRIPTION - PAIN TYPE
TYPE: SURGICAL PAIN
TYPE: ACUTE PAIN
TYPE: SURGICAL PAIN

## 2018-03-08 ASSESSMENT — PAIN DESCRIPTION - ONSET
ONSET: ON-GOING

## 2018-03-08 NOTE — PROGRESS NOTES
Pt asking for pain medication. No drainage. abdomen soft. Pt is eating and drinking without problems.

## 2018-03-08 NOTE — H&P
hepatosplenomegally. RECTAL: deferred, not clinically indicated  NEUROLOGIC: There are no focalizing motor or sensory deficits. CN II-XII are grossly intact. [place] EXTREMITIES: no cyanosis, no clubbing and no edema. Thank you for the interesting evaluation. Further recommendations as listed above. Electronically signed by Omar Ma DO on 2/27/2018 at 12:18 PM    Dominic Bazzi  History and Physical Update    Pt Name: Issa Cobb  MRN: [id number]  YOB: 1995  Date of evaluation: 3/8/2018    I have examined the patient and reviewed the H&P/Consult and there are no changes to the patient or plans.          Electronically signed by Omar Ma DO on 3/8/2018 at 12:06 PM

## 2018-03-08 NOTE — PROGRESS NOTES
Dr Dianne Quiroz to assess pt. No drainage from incisions sites. Pt is still received fluid bolus per Dr Dianne Quiroz. Dr Dianne Quiroz gave order.

## 2018-03-08 NOTE — ANESTHESIA PRE PROCEDURE
Department of Anesthesiology  Preprocedure Note       Name:  Fredi Chen   Age:  23 y.o.  :  1995                                          MRN:  [id number]         Date:  3/8/2018      Surgeon: Keke Cantu):  Agustin Leahy DO    Procedure: Procedure(s):  CHOLECYSTECTOMY LAPAROSCOPIC, POSS OPEN    Medications prior to admission:   Prior to Admission medications    Medication Sig Start Date End Date Taking? Authorizing Provider   Multiple Vitamins-Minerals (THERAPEUTIC MULTIVITAMIN-MINERALS) tablet Take 1 tablet by mouth daily   Yes Historical Provider, MD   NONFORMULARY Birth control   Yes Historical Provider, MD   FLUoxetine (PROZAC) 20 MG capsule Take 20 mg by mouth daily   Yes Historical Provider, MD   lansoprazole (PREVACID) 30 MG capsule Take 30 mg by mouth daily. Yes Historical Provider, MD       Current medications:    Current Facility-Administered Medications   Medication Dose Route Frequency Provider Last Rate Last Dose    0.9 % sodium chloride infusion   Intravenous Continuous Evelyn Nelson,  mL/hr at 18 1207      sodium chloride flush 0.9 % injection 10 mL  10 mL Intravenous 2 times per day Macyermina Klaudia Fletcherser, DO        sodium chloride flush 0.9 % injection 10 mL  10 mL Intravenous PRN Macyermina Klaudia Fletcherser, DO        cefOXitin (MEFOXIN) 2 g in dextrose 5% 50 mL (mini-bag)  2 g Intravenous On Call to  [facility],            Allergies: Allergies   Allergen Reactions    Latex Itching and Rash     Strong family history with need of epipen    Chloraprep One Step [Chlorhexidine Gluconate] Rash    Seasonal        Problem List:    Patient Active Problem List   Diagnosis Code    Concussion S06. 0X9A    Headache R51    Post concussion syndrome F07.81    Anxiety F41.9       Past Medical History:        Diagnosis Date    Camptodactyly     Fatigue     GERD (gastroesophageal reflux disease)     Headache(784.0)     Hormone disorder     over active prolactin hormone    MDRO (multiple drug resistant organisms) resistance 2011    right knee after surgery at [facility] - Treated by Dr. Sabiha Jack Septal defect     heart defect       Past Surgical History:        Procedure Laterality Date    FINGER SURGERY Left 2004    5th digit straightening due to camptodactyly - , [facility]    FINGER SURGERY      6 finger straightenings with Dr. Randi Lake @ [facility] ARTHROSCOPY  2011    [facility] - Dr. Alma Rosa Cano ARTHROSCOPY Right     TONSILLECTOMY AND ADENOIDECTOMY      WISDOM TOOTH EXTRACTION  01/2018       Social History:    Social History   Substance Use Topics    Smoking status: Never Smoker    Smokeless tobacco: Never Used    Alcohol use No                                Counseling given: Not Answered      Vital Signs (Current):   Vitals:    03/01/18 1424 03/08/18 1124   BP:  109/68   Pulse:  90   Resp:  16   Temp:  98.7 °F (37.1 °C)   TempSrc:  Temporal   SpO2:  99%   Weight: 140 lb (63.5 kg) 141 lb 6.4 oz (64.1 kg)   Height: 5' 6\" (1.676 m) 5' 6\" (1.676 m)                                              BP Readings from Last 3 Encounters:   03/08/18 109/68   02/27/18 106/68   02/13/18 122/64       NPO Status: Time of last liquid consumption: 0730 (sip with medication)                        Time of last solid consumption: 2300                        Date of last liquid consumption: 03/08/18                        Date of last solid food consumption: 03/07/18    BMI:   Wt Readings from Last 3 Encounters:   03/08/18 141 lb 6.4 oz (64.1 kg)   02/26/18 140 lb (63.5 kg)   02/13/18 144 lb 8 oz (65.5 kg)     Body mass index is 22.82 kg/m².     CBC:   Lab Results   Component Value Date    WBC 9.6 03/30/2012    RBC 4.44 03/30/2012    HCT 39.9 03/30/2012    MCV 89.8 03/30/2012    RDW 13.1 03/30/2012     03/30/2012       CMP:   Lab Results   Component Value Date     03/30/2012    K 4.3 03/30/2012    CREATININE 1.2 03/30/2012    PROT 6.9 02/27/2018    BILITOT 0.2 02/27/2018

## 2018-03-08 NOTE — OP NOTE
[address] [address]  Operative Report  [facility] [place] [facility] NO. [id number]  SURGEON: Mandeep Miller  Primary Care Physician: BRAN Landon     PROCEDURE PERFORMED:  3/8/2018  PREOPERATIVE DIAGNOSIS: CLAYTON ABD PAIN, CHRONIC CHOLECYSTITIS  POSTOPERATIVE DIAGNOSIS: Same, path pending  PROCEDURE PERFORMED:  Laparoscopic cholecystectomy. SURGEON:  Dr. Eris Nelson. ANESTHESIA:  General with local.  ESTIMATED BLOOD LOSS:  3 ml  SPECIMEN:  Gallbladder sent to pathology for analysis. COMPLICATIONS:  None immediately appreciated. DISCUSSION: Susanna Belle is a 23y.o. year old female who was seen in evaluation at request of BRAN Landon regarding signs and symptoms, gallbladder disease. After history and physical examination was performed potential diagnostic and therapeutic modalities discussed with the patient. Operative and non operative management was discussed. Laparoscopic and open approaches reviewed. She was given opportunity to ask questions. Once answered informed consent was obtained. She was brought to operating room on 3/8/2018 for procedure. OPERATIVE FINDINGS:  At time of laparoscopy liver contour was within normal limits. The gallbladder did have adhesions. Common bile duct was well visualized. Ductal structures well visualized. The remainder of abdominal viscera was unremarkable. Gallbladder removed as described below. PROCEDURE:  The patient was brought to the operating room and placed in supine position. Placed under continuous cardiac telemetry, blood pressure, pulse oximetry monitoring and placed under general anesthesia by anesthesia department. The anterior abdominal wall was prepped and draped in sterile fashion. 1 cm periumbilical incision made with #11 scalpel blade. 10 mm Optiview port inserted into the abdomen under direct visualization. Pneumoperitoneum was created to total of 17 mm of Mercury.   Visual inspection of the

## 2018-03-08 NOTE — PROGRESS NOTES
Dr Ender Grace is on call. Gave her information on patient with medical history of heart defect. Pulse ox and bp readings. order received.

## 2018-03-08 NOTE — PROGRESS NOTES
Patient admitted to [facility] room 14. Fall and allergy bands placed. David SCDS on. Patient and family oriented to room and call light.  Pain goal after surgery is 4/10

## 2018-03-09 ENCOUNTER — TELEPHONE (OUTPATIENT)
Dept: SURGERY | Age: 23
End: 2018-03-09

## 2018-03-09 PROCEDURE — 6370000000 HC RX 637 (ALT 250 FOR IP): Performed by: SURGERY

## 2018-03-09 PROCEDURE — 2500000003 HC RX 250 WO HCPCS: Performed by: SURGERY

## 2018-03-09 PROCEDURE — 2580000003 HC RX 258: Performed by: SURGERY

## 2018-03-09 PROCEDURE — 6360000002 HC RX W HCPCS: Performed by: SURGERY

## 2018-03-09 PROCEDURE — 94640 AIRWAY INHALATION TREATMENT: CPT

## 2018-03-09 PROCEDURE — 1200000003 HC TELEMETRY R&B

## 2018-03-09 PROCEDURE — 99024 POSTOP FOLLOW-UP VISIT: CPT | Performed by: SURGERY

## 2018-03-09 RX ORDER — BUMETANIDE 0.25 MG/ML
0.5 INJECTION, SOLUTION INTRAMUSCULAR; INTRAVENOUS ONCE
Status: COMPLETED | OUTPATIENT
Start: 2018-03-09 | End: 2018-03-09

## 2018-03-09 RX ADMIN — SODIUM CHLORIDE: 9 INJECTION, SOLUTION INTRAVENOUS at 23:57

## 2018-03-09 RX ADMIN — HYDROMORPHONE HYDROCHLORIDE 0.5 MG: 1 INJECTION, SOLUTION INTRAMUSCULAR; INTRAVENOUS; SUBCUTANEOUS at 22:58

## 2018-03-09 RX ADMIN — BUMETANIDE 0.5 MG: 0.25 INJECTION, SOLUTION INTRAMUSCULAR; INTRAVENOUS at 10:05

## 2018-03-09 RX ADMIN — ALBUTEROL SULFATE 2.5 MG: 2.5 SOLUTION RESPIRATORY (INHALATION) at 14:47

## 2018-03-09 RX ADMIN — HYDROMORPHONE HYDROCHLORIDE 0.5 MG: 1 INJECTION, SOLUTION INTRAMUSCULAR; INTRAVENOUS; SUBCUTANEOUS at 04:27

## 2018-03-09 RX ADMIN — HYDROMORPHONE HYDROCHLORIDE 0.5 MG: 1 INJECTION, SOLUTION INTRAMUSCULAR; INTRAVENOUS; SUBCUTANEOUS at 11:10

## 2018-03-09 RX ADMIN — HYDROCODONE BITARTRATE AND ACETAMINOPHEN 2 TABLET: 5; 325 TABLET ORAL at 08:38

## 2018-03-09 RX ADMIN — SODIUM CHLORIDE: 9 INJECTION, SOLUTION INTRAVENOUS at 06:39

## 2018-03-09 RX ADMIN — HYDROCODONE BITARTRATE AND ACETAMINOPHEN 2 TABLET: 5; 325 TABLET ORAL at 14:38

## 2018-03-09 RX ADMIN — HYDROCODONE BITARTRATE AND ACETAMINOPHEN 2 TABLET: 5; 325 TABLET ORAL at 18:58

## 2018-03-09 RX ADMIN — HYDROCODONE BITARTRATE AND ACETAMINOPHEN 2 TABLET: 5; 325 TABLET ORAL at 00:03

## 2018-03-09 ASSESSMENT — PAIN SCALES - GENERAL
PAINLEVEL_OUTOF10: 7
PAINLEVEL_OUTOF10: 6
PAINLEVEL_OUTOF10: 7
PAINLEVEL_OUTOF10: 4
PAINLEVEL_OUTOF10: 7
PAINLEVEL_OUTOF10: 7
PAINLEVEL_OUTOF10: 6
PAINLEVEL_OUTOF10: 7
PAINLEVEL_OUTOF10: 6
PAINLEVEL_OUTOF10: 0
PAINLEVEL_OUTOF10: 0
PAINLEVEL_OUTOF10: 6
PAINLEVEL_OUTOF10: 5

## 2018-03-09 ASSESSMENT — PAIN DESCRIPTION - LOCATION: LOCATION: ABDOMEN

## 2018-03-09 ASSESSMENT — PAIN DESCRIPTION - PAIN TYPE: TYPE: SURGICAL PAIN

## 2018-03-09 NOTE — PLAN OF CARE
Problem: Pain:  Goal: Pain level will decrease  Pain level will decrease   Outcome: Ongoing  Complains of abdominal surgical pain, prn dilaudid and toradol given with some relief    Problem: Daily Care:  Goal: Daily care needs are met  Daily care needs are met   Outcome: Ongoing  Pt assisted with ADLs    Problem: Skin Integrity:  Goal: Skin integrity will stabilize  Skin integrity will stabilize   Outcome: Ongoing  4 puncture sites to abdomen dry and intact. Steri strips in place. No drainage noted. No other skin breakdown noted. Problem: Discharge Planning:  Goal: Patients continuum of care needs are met  Patients continuum of care needs are met   Outcome: Ongoing  Pt plans on returning home with family at discharge    Comments: Care plan reviewed with patient and family. Patient and family verbalize understanding of the plan of care and contribute to goal setting.

## 2018-03-09 NOTE — PROGRESS NOTES
[facility] RESPIRATORY ASSESSMENT PROGRAM (RAP)  30 kg and over      Patient Name: Ash Walker Room#: 6K-06/006-A : 1995     Admitting diagnosis: Hypoxia [R09.02]      ASSESSMENT     Vitals  Pulse: 76   Resp: 16  BP: 101/65  SpO2: 93 %  Temp: 98.2 °F (36.8 °C)  Breath Sounds: clear/diminished t/o lungs  Comment:     PEF   Predicted:   Personal Best:      Inspiratory Capacity:   Preoperative/predictive value: 2900 ml   33% of value: 957 ml      75% of value: 2175 ml   10 ml/kg of IBW: 593 ml   Patient's Actual Inspiratory Capacity: 1500 ml    CARE PLAN  All Care Plan selections must be based on the approved algorithms located on line in the Policy and Procedures under Respiratory Assessment Adult Protocol Handbook    INDICATIONS FOR THERAPY BASED ON HISTORY AND ASSESSMENT  Bronchial Hygiene Goal: Improvement in sputum mobilization in patients with ineffective airway clearance. Reverse the atelectasis. [] Atelectasis caused by mucus plugging     [] Chronic mucucillary clearance disorder  [] Improve cough effectiveness. Copious secretions unable to clear with cough or suctioning. [x] No indications  Volume Expansion Goal: Prevent atelectasis, Absence or improvement in signs of atelectasis, improve respiratory muscle performance  [] Post Thoracic or upper abdominal surgery    [] Surgery in COPD patients  [] Atelectasis, reduced lung volume on X-ray    [] CPAP indications are seen  [] Restrictive pulmonary or neuromuscular disorder   [x]  Bilateral perihilar airspace opacity concerning for pneumonia, edema, or aspiration.   Inhaled Medications Goal: Improve respiratory functions in patients with airway disease and decrease WOB  [] Wheezing, diminished, or absent breath sounds associated with a pulmonary disorder  [] Known COPD  [] Peak flow < 80% predicted or personal best for known asthma patients  [] Documented obstructive defect on pulmonary function testing which is reversible   [] With a mucolytic to day  After treatment     ASSESSMENT OUTCOMES   Bronchial Hygiene   []Sputum production > 25 ml/day       [] Improved chest x-ray  []Patients subjective response (easier clearance of secretions)      [] Improved breath sounds  []Improvement in PaO2 or oxygen saturation       [] Return of patient to home regime   []Improvement in ventilator variables    []Other:     Volume Expansion  []Decrease respiratory rate   []Resolution of fever    []Normal pulse rate    []Improved breath sounds   [x]Normal chest x-ray     [] Improved arterial oxygen tension (PaO2) and p(A-a)O2  []Return of IC to 75% of preop/predicted goal or an increase to 15 ml/kg of ideal body weight   []Other:     Inhaled Medication  []Improved assessment score   []Return of patient to home regime  []Other:     Oxygenation  [x]SpO2 greater than or equal to 92%   []Reversed signs of hypoxemia and improvement in WOB  []Correction of Hgb disorder    []Return of patient to home regime  []Other: . Action Plan Based on Assessment:   []Continue plan as ordered   []Change therapy based on algorithm   []Consult with physician  []Discontinue therapy and RAP (indications no longer present)  []Not enough information available, reassess in 24 hours  []Other:     Comments:  Encouraged pt to use IS Q1 W/A and cough and deep breathe. Dr Ulysses Jing ordered Albuterol nebs [id number].

## 2018-03-09 NOTE — PROGRESS NOTES
Perfect serve Dr Tnoi Suarez for new order for pain medication. Pt asking for Percocet instead of Norco Pt states Mandi Lafleur doesn't work for her.   Asked for order for Toradol

## 2018-03-09 NOTE — PROGRESS NOTES
Pt up to bathroom was able to void again. Pt is complaining of being short of breath without oxygen on.

## 2018-03-10 VITALS
OXYGEN SATURATION: 98 % | TEMPERATURE: 98.9 F | SYSTOLIC BLOOD PRESSURE: 102 MMHG | HEIGHT: 66 IN | WEIGHT: 142 LBS | BODY MASS INDEX: 22.82 KG/M2 | HEART RATE: 78 BPM | DIASTOLIC BLOOD PRESSURE: 67 MMHG | RESPIRATION RATE: 16 BRPM

## 2018-03-10 PROCEDURE — 6370000000 HC RX 637 (ALT 250 FOR IP): Performed by: SURGERY

## 2018-03-10 PROCEDURE — 99024 POSTOP FOLLOW-UP VISIT: CPT | Performed by: SURGERY

## 2018-03-10 RX ADMIN — HYDROCODONE BITARTRATE AND ACETAMINOPHEN 1 TABLET: 5; 325 TABLET ORAL at 03:53

## 2018-03-10 RX ADMIN — HYDROCODONE BITARTRATE AND ACETAMINOPHEN 2 TABLET: 5; 325 TABLET ORAL at 09:04

## 2018-03-10 ASSESSMENT — PAIN SCALES - GENERAL
PAINLEVEL_OUTOF10: 6
PAINLEVEL_OUTOF10: 4
PAINLEVEL_OUTOF10: 7
PAINLEVEL_OUTOF10: 0
PAINLEVEL_OUTOF10: 7

## 2018-03-10 NOTE — PROGRESS NOTES
Discharge teaching and instructions for diagnosis/procedure of hypoxia completed with patient using teachback method. AVS reviewed. Printed prescriptions given to patient. Patient voiced understanding regarding prescriptions, follow up appointments, and care of self at home. Discharged in a wheelchair to  home with support per family.

## 2018-03-10 NOTE — PLAN OF CARE
Problem: Pain:  Goal: Pain level will decrease  Pain level will decrease   Outcome: Ongoing  Pain medication given per order. Effective. Pt appears to be resting in bed at this time. Problem: Daily Care:  Goal: Daily care needs are met  Daily care needs are met   Outcome: Ongoing  Daily care needs met. Pt able to make needs known and use call system. Problem: Skin Integrity:  Goal: Skin integrity will stabilize  Skin integrity will stabilize   Outcome: Ongoing  No open areas noted to bony prominence. Pt has 4 punctures sites from surgery. No signs of infection noted. Problem: Discharge Planning:  Goal: Patients continuum of care needs are met  Patients continuum of care needs are met   Outcome: Ongoing  Pt plans to be discharged home tomorrow am.     Problem: Falls - Risk of:  Goal: Will remain free from falls  Will remain free from falls   Outcome: Ongoing  No falls noted this shift. Pt uses call system. Pt alert and oriented x 4. Comments: Care plan reviewed with patient. Patient verbalize understanding of the plan of care and contribute to goal setting.

## 2018-03-10 NOTE — DISCHARGE INSTR - DIET

## 2018-03-12 NOTE — CARE COORDINATION
3/12/18, 2:41 PM    Late entry:   Home with parents. Discharge plan discussed by  and . Discharge plan reviewed with patient/ family. Patient/ family verbalize understanding of discharge plan and are in agreement with plan. Understanding was demonstrated using the teach back method.

## 2018-03-13 ENCOUNTER — TELEPHONE (OUTPATIENT)
Dept: SURGERY | Age: 23
End: 2018-03-13

## 2018-03-13 ENCOUNTER — TELEPHONE (OUTPATIENT)
Dept: ADMINISTRATIVE | Age: 23
End: 2018-03-13

## 2018-03-13 NOTE — TELEPHONE ENCOUNTER
Patients mom,Tracey, is calling stating Scarlet Ponce and Monday she has been coughing a lot more and is increasingly getting worse states David Petty is complaining her lungs \"hurt\" She is not running any fevers and is not wheezing. Wants to know if you could possibly order an inhaler or nebulizer treatments? Please advise.      s/p Simona Vee-3/8/18

## 2018-03-19 NOTE — PROGRESS NOTES
[facility]  [facility]  Postprocedure Evaluation in Office  Pt Name: Issa Cobb  Date of Birth 1995   Today's Date: 3/20/2018  Medical Record Number: [id number]  Referring Provider: No ref. provider found  Primary Care Provider: BRAN Merritt  Chief Complaint   Patient presents with   [facility] Post-Op Check     S/p Laparoscopic cholecystectomy. 3/8/18     ASSESSMENT      1. Chronic cholecystitis     2. S/P laparoscopic cholecystectomy      3/8/18   Incisions are clean, dry and intact or healing as expected   PLANS      Pathology reviewed with the patient who understands. All questions were answered. Patient Instructions   May return to full activity without restrictions. Rx Phenergan and albuterol  Follow up: Return in about 2 weeks (around 4/3/2018). Meka Gregory is seen today for post-op follow-up. She is 2 week(s) status post L/S cholecystectomy. She is tolerating a regular diet, having regular bowel movements. She does complain of nausea when eating most anything. The surgical site is clean and has no drainage. Pain is controlled without any narcotic pain medications. She has compliant with postoperative instructions. She was admitted following the procedure for pulmonary edema from laryngospasm. She currently still having some wheezing with activity and a non productive cough.    Past Medical History  Past Medical History:   Diagnosis Date    Camptodactyly     Fatigue     GERD (gastroesophageal reflux disease)     Headache(784.0)     Hormone disorder     over active prolactin hormone    MDRO (multiple drug resistant organisms) resistance 2011    right knee after surgery at [facility] - Treated by Dr. Vipul Hodge Septal defect     heart defect     Past Surgical History  Past Surgical History:   Procedure Laterality Date    FINGER SURGERY Left 2004    5th digit straightening due to camptodactyly - , [facility]    FINGER SURGERY      6 finger orthopnea or syncope. GI: Denies any vomiting, blood in the stool, constipation or diarrhea. Positive for incisional discomfort only. + nausea  : Denies any hematuria, hesitancy or dysuria. OBJECTIVE    VITALS:  height is 5' 6\" (1.676 m) and weight is 145 lb 1.6 oz (65.8 kg). Her tympanic temperature is 98.5 °F (36.9 °C). Her blood pressure is 116/70 and her pulse is 83. Her respiration is 18 and oxygen saturation is 96%. Pain Score:   2  CONSTITUTIONAL: Alert and oriented times 3, no acute distress and cooperative to examination. SKIN: Skin color, texture, turgor normal.  INCISION: wound margins intact and healing well. No signs of infection. No drainage. HEENT: no scleral icterus  ABDOMEN: soft, nondistended, no masses or organomegaly. Bowel sounds normal. Laparoscopic scars. No sign of hematoma or seroma. Tenderness to palpation incisional only. Thank you for the interesting evaluation. Further recommendations as listed above.        Electronically signed by Fam De La Cruz DO on 3/20/2018 at 12:06 PM

## 2018-03-20 ENCOUNTER — OFFICE VISIT (OUTPATIENT)
Dept: SURGERY | Age: 23
End: 2018-03-20

## 2018-03-20 VITALS
HEIGHT: 66 IN | WEIGHT: 145.1 LBS | RESPIRATION RATE: 18 BRPM | SYSTOLIC BLOOD PRESSURE: 116 MMHG | OXYGEN SATURATION: 96 % | HEART RATE: 83 BPM | TEMPERATURE: 98.5 F | DIASTOLIC BLOOD PRESSURE: 70 MMHG | BODY MASS INDEX: 23.32 KG/M2

## 2018-03-20 DIAGNOSIS — K81.1 CHRONIC CHOLECYSTITIS: Primary | ICD-10-CM

## 2018-03-20 DIAGNOSIS — Z90.49 S/P LAPAROSCOPIC CHOLECYSTECTOMY: ICD-10-CM

## 2018-03-20 PROCEDURE — 99024 POSTOP FOLLOW-UP VISIT: CPT | Performed by: SURGERY

## 2018-03-20 RX ORDER — PROMETHAZINE HYDROCHLORIDE 12.5 MG/1
12.5 TABLET ORAL EVERY 6 HOURS PRN
Qty: 20 TABLET | Refills: 0 | Status: SHIPPED | OUTPATIENT
Start: 2018-03-20 | End: 2018-03-27

## 2018-03-20 NOTE — LETTER
[facility]  [address], [place]  Phone- [phone]  Fax [phone]    Pt Name: Ede Caldwell Record Number: [id number]  Date of Birth 1995   Today's Date: 3/20/2018    Zulema Taylor was evaluated in the office today. My assessment and plans are listed below. ASSESSMENT        1. Chronic cholecystitis     2. S/P laparoscopic cholecystectomy      3/8/18   Incisions are clean, dry and intact or healing as expected  PLANS:        Pathology reviewed with the patient who understands. All questions were answered. Patient Instructions   May return to full activity without restrictions. Rx Phenergan and albuterol  Follow up: Return in about 2 weeks (around 4/3/2018). If I can provide any additional assistance or you have any concerns, please feel free to contact me. Thank you for allowing to participate in the care of your patients. Sincerely,      Mandeep Solis

## 2018-04-10 ENCOUNTER — OFFICE VISIT (OUTPATIENT)
Dept: SURGERY | Age: 23
End: 2018-04-10

## 2018-04-10 VITALS
SYSTOLIC BLOOD PRESSURE: 108 MMHG | TEMPERATURE: 97.1 F | HEART RATE: 77 BPM | BODY MASS INDEX: 24.43 KG/M2 | RESPIRATION RATE: 16 BRPM | OXYGEN SATURATION: 99 % | WEIGHT: 152 LBS | DIASTOLIC BLOOD PRESSURE: 64 MMHG | HEIGHT: 66 IN

## 2018-04-10 DIAGNOSIS — Z90.49 S/P LAPAROSCOPIC CHOLECYSTECTOMY: Primary | ICD-10-CM

## 2018-04-10 PROCEDURE — 99024 POSTOP FOLLOW-UP VISIT: CPT | Performed by: SURGERY

## 2018-05-25 ENCOUNTER — HOSPITAL ENCOUNTER (OUTPATIENT)
Dept: CT IMAGING | Age: 23
Discharge: HOME | End: 2018-05-25
Payer: COMMERCIAL

## 2018-05-25 DIAGNOSIS — D64.9 ANEMIA, UNSPECIFIED TYPE: ICD-10-CM

## 2018-05-25 PROCEDURE — 74177 CT ABD & PELVIS W/CONTRAST: CPT

## 2018-05-25 PROCEDURE — 6360000004 HC RX CONTRAST MEDICATION: Performed by: INTERNAL MEDICINE

## 2018-05-25 RX ADMIN — IOPAMIDOL 85 ML: 755 INJECTION, SOLUTION INTRAVENOUS at 11:09

## 2018-05-30 ENCOUNTER — TELEPHONE (OUTPATIENT)
Dept: SURGERY | Age: 23
End: 2018-05-30

## 2018-07-31 ENCOUNTER — HOSPITAL ENCOUNTER (EMERGENCY)
Age: 23
Discharge: HOME | End: 2018-08-01
Payer: COMMERCIAL

## 2018-07-31 ENCOUNTER — APPOINTMENT (OUTPATIENT)
Dept: CT IMAGING | Age: 23
End: 2018-07-31
Payer: COMMERCIAL

## 2018-07-31 DIAGNOSIS — K59.00 CONSTIPATION, UNSPECIFIED CONSTIPATION TYPE: Primary | ICD-10-CM

## 2018-07-31 DIAGNOSIS — Z71.1 CONCERN ABOUT STD IN FEMALE WITHOUT DIAGNOSIS: ICD-10-CM

## 2018-07-31 LAB
ALBUMIN SERPL-MCNC: 4.3 G/DL (ref 3.5–5.1)
ALP BLD-CCNC: 42 U/L (ref 38–126)
ALT SERPL-CCNC: 12 U/L (ref 11–66)
ANION GAP SERPL CALCULATED.3IONS-SCNC: 15 MEQ/L (ref 8–16)
AST SERPL-CCNC: 21 U/L (ref 5–40)
BACTERIA: ABNORMAL /HPF
BASOPHILS # BLD: 0.6 %
BASOPHILS ABSOLUTE: 0 THOU/MM3 (ref 0–0.1)
BILIRUB SERPL-MCNC: 0.2 MG/DL (ref 0.3–1.2)
BILIRUBIN URINE: NEGATIVE
BLOOD, URINE: ABNORMAL
BUN BLDV-MCNC: 13 MG/DL (ref 7–22)
CALCIUM SERPL-MCNC: 10 MG/DL (ref 8.5–10.5)
CASTS 2: ABNORMAL /LPF
CASTS UA: ABNORMAL /LPF
CHARACTER, URINE: CLEAR
CHLORIDE BLD-SCNC: 104 MEQ/L (ref 98–111)
CO2: 23 MEQ/L (ref 23–33)
COLOR: YELLOW
CREAT SERPL-MCNC: 0.8 MG/DL (ref 0.4–1.2)
CRYSTALS, UA: ABNORMAL
EOSINOPHIL # BLD: 1.8 %
EOSINOPHILS ABSOLUTE: 0.1 THOU/MM3 (ref 0–0.4)
EPITHELIAL CELLS, UA: ABNORMAL /HPF
ERYTHROCYTE [DISTWIDTH] IN BLOOD BY AUTOMATED COUNT: 11.9 % (ref 11.5–14.5)
ERYTHROCYTE [DISTWIDTH] IN BLOOD BY AUTOMATED COUNT: 39.7 FL (ref 35–45)
GFR SERPL CREATININE-BSD FRML MDRD: > 90 ML/MIN/1.73M2
GLUCOSE BLD-MCNC: 89 MG/DL (ref 70–108)
GLUCOSE URINE: NEGATIVE MG/DL
HCT VFR BLD CALC: 39.3 % (ref 37–47)
HEMOGLOBIN: 13.2 GM/DL (ref 12–16)
IMMATURE GRANS (ABS): 0.02 THOU/MM3 (ref 0–0.07)
IMMATURE GRANULOCYTES: 0.3 %
KETONES, URINE: NEGATIVE
KOH PREP: NORMAL
LEUKOCYTE ESTERASE, URINE: NEGATIVE
LYMPHOCYTES # BLD: 52.1 %
LYMPHOCYTES ABSOLUTE: 4.1 THOU/MM3 (ref 1–4.8)
MCH RBC QN AUTO: 30.8 PG (ref 26–33)
MCHC RBC AUTO-ENTMCNC: 33.6 GM/DL (ref 32.2–35.5)
MCV RBC AUTO: 91.6 FL (ref 81–99)
MISCELLANEOUS 2: ABNORMAL
MONOCYTES # BLD: 6.5 %
MONOCYTES ABSOLUTE: 0.5 THOU/MM3 (ref 0.4–1.3)
NITRITE, URINE: NEGATIVE
NUCLEATED RED BLOOD CELLS: 0 /100 WBC
OSMOLALITY CALCULATION: 282.7 MOSMOL/KG (ref 275–300)
PH UA: 7.5
PLATELET # BLD: 251 THOU/MM3 (ref 130–400)
PMV BLD AUTO: 9.5 FL (ref 9.4–12.4)
POTASSIUM SERPL-SCNC: 3.8 MEQ/L (ref 3.5–5.2)
PREGNANCY, SERUM: NEGATIVE
PREGNANCY, URINE: NEGATIVE
PROTEIN UA: NEGATIVE
RBC # BLD: 4.29 MILL/MM3 (ref 4.2–5.4)
RBC URINE: ABNORMAL /HPF
RENAL EPITHELIAL, UA: ABNORMAL
SEG NEUTROPHILS: 38.7 %
SEGMENTED NEUTROPHILS ABSOLUTE COUNT: 3.1 THOU/MM3 (ref 1.8–7.7)
SODIUM BLD-SCNC: 142 MEQ/L (ref 135–145)
SPECIFIC GRAVITY, URINE: 1.01 (ref 1–1.03)
TOTAL PROTEIN: 7.6 G/DL (ref 6.1–8)
TRICHOMONAS PREP: NORMAL
UROBILINOGEN, URINE: 0.2 EU/DL
WBC # BLD: 7.9 THOU/MM3 (ref 4.8–10.8)
WBC UA: ABNORMAL /HPF
YEAST: ABNORMAL

## 2018-07-31 PROCEDURE — 84703 CHORIONIC GONADOTROPIN ASSAY: CPT

## 2018-07-31 PROCEDURE — 87070 CULTURE OTHR SPECIMN AEROBIC: CPT

## 2018-07-31 PROCEDURE — 87491 CHLMYD TRACH DNA AMP PROBE: CPT

## 2018-07-31 PROCEDURE — 81025 URINE PREGNANCY TEST: CPT

## 2018-07-31 PROCEDURE — 2580000003 HC RX 258: Performed by: STUDENT IN AN ORGANIZED HEALTH CARE EDUCATION/TRAINING PROGRAM

## 2018-07-31 PROCEDURE — 6360000002 HC RX W HCPCS: Performed by: STUDENT IN AN ORGANIZED HEALTH CARE EDUCATION/TRAINING PROGRAM

## 2018-07-31 PROCEDURE — 87591 N.GONORRHOEAE DNA AMP PROB: CPT

## 2018-07-31 PROCEDURE — 96374 THER/PROPH/DIAG INJ IV PUSH: CPT

## 2018-07-31 PROCEDURE — 87205 SMEAR GRAM STAIN: CPT

## 2018-07-31 PROCEDURE — 6360000004 HC RX CONTRAST MEDICATION: Performed by: STUDENT IN AN ORGANIZED HEALTH CARE EDUCATION/TRAINING PROGRAM

## 2018-07-31 PROCEDURE — 85025 COMPLETE CBC W/AUTO DIFF WBC: CPT

## 2018-07-31 PROCEDURE — 81001 URINALYSIS AUTO W/SCOPE: CPT

## 2018-07-31 PROCEDURE — 87210 SMEAR WET MOUNT SALINE/INK: CPT

## 2018-07-31 PROCEDURE — 36415 COLL VENOUS BLD VENIPUNCTURE: CPT

## 2018-07-31 PROCEDURE — 74178 CT ABD&PLV WO CNTR FLWD CNTR: CPT

## 2018-07-31 PROCEDURE — 99285 EMERGENCY DEPT VISIT HI MDM: CPT

## 2018-07-31 PROCEDURE — 80053 COMPREHEN METABOLIC PANEL: CPT

## 2018-07-31 PROCEDURE — 87220 TISSUE EXAM FOR FUNGI: CPT

## 2018-07-31 RX ORDER — KETOROLAC TROMETHAMINE 30 MG/ML
30 INJECTION, SOLUTION INTRAMUSCULAR; INTRAVENOUS ONCE
Status: COMPLETED | OUTPATIENT
Start: 2018-07-31 | End: 2018-07-31

## 2018-07-31 RX ORDER — POLYETHYLENE GLYCOL 3350 17 G/17G
17 POWDER, FOR SOLUTION ORAL DAILY
Status: DISCONTINUED | OUTPATIENT
Start: 2018-08-01 | End: 2018-08-01

## 2018-07-31 RX ORDER — 0.9 % SODIUM CHLORIDE 0.9 %
1000 INTRAVENOUS SOLUTION INTRAVENOUS ONCE
Status: COMPLETED | OUTPATIENT
Start: 2018-07-31 | End: 2018-08-01

## 2018-07-31 RX ADMIN — SODIUM CHLORIDE 1000 ML: 9 INJECTION, SOLUTION INTRAVENOUS at 22:30

## 2018-07-31 RX ADMIN — IOPAMIDOL 85 ML: 755 INJECTION, SOLUTION INTRAVENOUS at 22:40

## 2018-07-31 RX ADMIN — KETOROLAC TROMETHAMINE 30 MG: 30 INJECTION, SOLUTION INTRAMUSCULAR at 23:33

## 2018-07-31 ASSESSMENT — ENCOUNTER SYMPTOMS
EYE DISCHARGE: 0
WHEEZING: 0
DIARRHEA: 1
NAUSEA: 1
VOMITING: 0
BACK PAIN: 0
EYE PAIN: 0
COUGH: 1
BLOOD IN STOOL: 0
SHORTNESS OF BREATH: 0
ABDOMINAL PAIN: 0
RHINORRHEA: 1
SORE THROAT: 0

## 2018-07-31 ASSESSMENT — PAIN DESCRIPTION - LOCATION: LOCATION: FLANK

## 2018-07-31 ASSESSMENT — PAIN DESCRIPTION - PAIN TYPE: TYPE: ACUTE PAIN

## 2018-07-31 ASSESSMENT — PAIN DESCRIPTION - ORIENTATION: ORIENTATION: RIGHT

## 2018-07-31 ASSESSMENT — PAIN SCALES - GENERAL: PAINLEVEL_OUTOF10: 10

## 2018-08-01 VITALS
RESPIRATION RATE: 16 BRPM | SYSTOLIC BLOOD PRESSURE: 92 MMHG | BODY MASS INDEX: 24.43 KG/M2 | HEART RATE: 72 BPM | OXYGEN SATURATION: 98 % | WEIGHT: 152 LBS | HEIGHT: 66 IN | DIASTOLIC BLOOD PRESSURE: 53 MMHG | TEMPERATURE: 98.3 F

## 2018-08-01 LAB
CHLAMYDIA TRACHOMATIS BY RT-PCR: NOT DETECTED
CT/NG SOURCE: NORMAL
NEISSERIA GONORRHOEAE BY RT-PCR: NOT DETECTED

## 2018-08-01 PROCEDURE — 2500000003 HC RX 250 WO HCPCS: Performed by: STUDENT IN AN ORGANIZED HEALTH CARE EDUCATION/TRAINING PROGRAM

## 2018-08-01 PROCEDURE — 6360000002 HC RX W HCPCS: Performed by: STUDENT IN AN ORGANIZED HEALTH CARE EDUCATION/TRAINING PROGRAM

## 2018-08-01 PROCEDURE — 6370000000 HC RX 637 (ALT 250 FOR IP): Performed by: STUDENT IN AN ORGANIZED HEALTH CARE EDUCATION/TRAINING PROGRAM

## 2018-08-01 PROCEDURE — 96372 THER/PROPH/DIAG INJ SC/IM: CPT

## 2018-08-01 RX ORDER — DOXYCYCLINE HYCLATE 100 MG
100 TABLET ORAL 2 TIMES DAILY
Qty: 28 TABLET | Refills: 0 | Status: SHIPPED | OUTPATIENT
Start: 2018-08-01 | End: 2018-08-15

## 2018-08-01 RX ORDER — POLYETHYLENE GLYCOL 3350 17 G/17G
17 POWDER, FOR SOLUTION ORAL ONCE
Status: COMPLETED | OUTPATIENT
Start: 2018-08-01 | End: 2018-08-01

## 2018-08-01 RX ORDER — POLYETHYLENE GLYCOL 3350 17 G/17G
17 POWDER, FOR SOLUTION ORAL DAILY
Qty: 510 G | Refills: 0 | Status: SHIPPED | OUTPATIENT
Start: 2018-08-01 | End: 2018-08-31

## 2018-08-01 RX ADMIN — POLYETHYLENE GLYCOL 3350 17 G: 17 POWDER, FOR SOLUTION ORAL at 00:12

## 2018-08-01 RX ADMIN — LIDOCAINE HYDROCHLORIDE 250 MG: 10 INJECTION, SOLUTION EPIDURAL; INFILTRATION; INTRACAUDAL; PERINEURAL at 00:17

## 2018-08-01 ASSESSMENT — PAIN DESCRIPTION - ORIENTATION: ORIENTATION: RIGHT

## 2018-08-01 ASSESSMENT — PAIN DESCRIPTION - FREQUENCY: FREQUENCY: CONTINUOUS

## 2018-08-01 ASSESSMENT — PAIN SCALES - GENERAL: PAINLEVEL_OUTOF10: 7

## 2018-08-01 ASSESSMENT — PAIN DESCRIPTION - LOCATION: LOCATION: FLANK

## 2018-08-01 ASSESSMENT — PAIN DESCRIPTION - DESCRIPTORS: DESCRIPTORS: ACHING

## 2018-08-01 ASSESSMENT — PAIN DESCRIPTION - PAIN TYPE: TYPE: ACUTE PAIN

## 2018-08-01 ASSESSMENT — PAIN DESCRIPTION - ONSET: ONSET: ON-GOING

## 2018-08-01 NOTE — ED PROVIDER NOTES
[facility]  eMERGENCY dEPARTMENT eNCOUnter          [address] [facility]       Chief Complaint   Patient presents with    Flank Pain    Rib Pain       Nurses Notes reviewed and I agree except as noted in the HPI. HISTORY OF PRESENT ILLNESS    Marylen Caldron is a 26 y.o. female who presents to the Emergency Department for the evaluation of right lateral chest wall pain that began this evening while she was standing in the shower. Patient reports that she is also experiencing a faint bloody vaginal discharge, diarrhea, nausea, urinary frequency, and mild sneezing, rhinorrhea, and cough. She rates her pain at a constant 10/10 in severity and describes it like someone is \"stabbing\" her. She denies experiencing ay emesis, feer, blood in stool, or dysuria. Patient denies any new antibiotic use but states that she takes a birth control pill everyday. She denies chance of pregnancy. Patient had a cholecystectomy in April and also has acid reflux. She denies any further complaints at this time. REVIEW OF SYSTEMS     Review of Systems   Constitutional: Negative for appetite change, chills, fatigue and fever. HENT: Positive for rhinorrhea (Mild) and sneezing (Mild). Negative for congestion, ear pain and sore throat. Eyes: Negative for pain, discharge and visual disturbance. Respiratory: Positive for cough (Mild). Negative for shortness of breath and wheezing. Cardiovascular: Negative for chest pain, palpitations and leg swelling. Gastrointestinal: Positive for diarrhea and nausea. Negative for abdominal pain, blood in stool and vomiting. Genitourinary: Positive for frequency and vaginal discharge (Bloody). Negative for difficulty urinating, dysuria and hematuria. Musculoskeletal: Positive for arthralgias (Right lateral chest wall pain). Negative for back pain, joint swelling and neck pain. Skin: Negative for pallor and rash.    Neurological: Negative for dizziness, syncope, weakness, light-headedness, numbness and headaches. Hematological: Negative for adenopathy. Psychiatric/Behavioral: Negative for confusion and suicidal ideas. The patient is not nervous/anxious. PAST MEDICAL HISTORY    has a past medical history of Camptodactyly; Fatigue; GERD (gastroesophageal reflux disease); Headache(784.0); Hormone disorder; MDRO (multiple drug resistant organisms) resistance; and Septal defect. SURGICAL HISTORY      has a past surgical history that includes Knee arthroscopy (2011); Shoulder arthroscopy (Right); Tonsillectomy and adenoidectomy; Finger surgery (Left, 2004); Finger surgery; [place] tooth extraction (01/2018); and pr lap,cholecystectomy (N/A, 3/8/2018). CURRENT MEDICATIONS       Previous Medications    ALBUTEROL (PROVENTIL) (5 MG/ML) 0.5% NEBULIZER SOLUTION    Take 1 mL by nebulization 4 times daily as needed for Wheezing    CALCIUM CARBONATE (TUMS) 500 MG CHEWABLE TABLET    Take 1 tablet by mouth After every meal    FLUOXETINE (PROZAC) 20 MG CAPSULE    Take 20 mg by mouth daily    LANSOPRAZOLE (PREVACID) 30 MG CAPSULE    Take 30 mg by mouth daily. MULTIPLE VITAMINS-MINERALS (THERAPEUTIC MULTIVITAMIN-MINERALS) TABLET    Take 1 tablet by mouth daily    NONFORMULARY    Birth control    RANITIDINE (ZANTAC) 150 MG TABLET    Take 150 mg by mouth daily as needed for Heartburn       ALLERGIES     is allergic to latex; chloraprep one step [chlorhexidine gluconate]; and seasonal.    FAMILY HISTORY     indicated that her mother is alive. She indicated that her father is alive. She indicated that the status of her maternal grandmother is unknown. She indicated that the status of her maternal grandfather is unknown. She indicated that the status of her paternal grandmother is unknown.  She indicated that the status of her other is unknown.    family history includes Cancer in her maternal grandfather; Depression in her maternal grandmother; Diabetes in her maternal mis-transcribed.)    The patient was given an opportunity to see the Emergency Attending. The patient voiced understanding that I was a Mid-Level Provider and was in agreement with being seen independently by myself. Scribe:  Aroldo Almaguer 12/23/16 10:31 AM Scribing for and in the presence of Kristy Chung PA-C.     Signed by: Jeff Newton, 08/01/18 12:05 AM    Provider:  I personally performed the services described in the documentation, reviewed and edited the documentation which was dictated to the scribe in my presence, and it accurately records my words and actions.     Kristy Chung PA-C 7/31/18 12:05 AM    TIBURCIO Newton PA-C  08/01/18 [id number]

## 2018-08-01 NOTE — ED TRIAGE NOTES
Patient arrived to ED with c/o right flank/rib pain that started suddenly while standing in the shower. Significant other at bedside currently. Patient reports that her pain is a 10 on a 0-10 scale. Skin is warm and dry/appropriate for ethnicity. Peripheral pulses are palpable bilaterally. Lung fields are free from adventitious sounds/diminished d/t pain. Bowel sounds audible and active at this time. Denies tenderness to abdomen. Monitor in placed. Urine sent to lab.

## 2018-08-03 LAB
GENITAL CULTURE, ROUTINE: NORMAL
GRAM STAIN RESULT: NORMAL

## 2018-09-18 ENCOUNTER — HOSPITAL ENCOUNTER (OUTPATIENT)
Dept: NUCLEAR MEDICINE | Age: 23
Discharge: HOME | End: 2018-09-18
Payer: COMMERCIAL

## 2018-09-18 DIAGNOSIS — K21.9 GASTROESOPHAGEAL REFLUX DISEASE, ESOPHAGITIS PRESENCE NOT SPECIFIED: ICD-10-CM

## 2018-09-18 DIAGNOSIS — R10.13 ABDOMINAL PAIN, EPIGASTRIC: ICD-10-CM

## 2018-09-18 DIAGNOSIS — R11.0 NAUSEA: ICD-10-CM

## 2018-09-18 PROCEDURE — A9541 TC99M SULFUR COLLOID: HCPCS | Performed by: NURSE PRACTITIONER

## 2018-09-18 PROCEDURE — 78264 GASTRIC EMPTYING IMG STUDY: CPT

## 2018-09-18 PROCEDURE — 3430000000 HC RX DIAGNOSTIC RADIOPHARMACEUTICAL: Performed by: NURSE PRACTITIONER

## 2018-09-18 RX ADMIN — Medication 960 MICRO CURIE: at 07:30

## 2018-11-04 NOTE — PROGRESS NOTES
[facility]                                                                                      Sleep Medicine Initial Consultation    Alec Galindo                                           Chief Complaint:  New pt sleep consult ref by Dr Aleta Mishra     Chief complaint: Alec Galindo is a 26 y. o.oldfemale came for further evaluation regarding her ?sleep apnea  with referral from Dr.David Mayco MD from [facility]. - Pain physician. [place]:  Sleep/Wake schedule:  Usual time to go to bed during the work/regular day of week: 9:00 PM.  Usual time to wake up during the work//regular day of week: 6:00 AM.  Over the weekends her sleep schedule: [x] Remain same. She usually falls a sleep in less than: 10 to 15 minutes. She takes naps: Yes. Number of naps per week:  2 times. During each nap she spends a total of: 1hour. The naps were reported as refreshing: No.     Sleep Hygiene:    Is the temperature and evironment in her bed room is acceptable to her: Yes. She watches Television in her bed room: Yes- occasionally  She read books, study, pay bills etc in the bed: Yes. Frequency She wake up during night/sleep: 1 to 2 times  Majority of nocturnal awakenings are for urination: Yes. Difficulty in falling back to sleep after nocturnal awakenings: No  .  Do you drink coffee:   [x] Yes. 1 cup per day. Do you drink caffeinated beverages i.e sodas:   [x]  No   Do you drink tea:   [x]  No   Do you drink alcoholic beverages:   [x] Yes. 2 drink/s per week. History of recreational drug use: No.     Sleep apnea symptoms:  Noticed to have loud snoring:No.   Witnessed apneas during sleep noticed: Yes. Noted by her family member- spouse. History of choking and gasping sensation at night time: Yes. History of headaches in the morning:Yes. History of dry mouth in the morning: Yes.   History of palpitations during night time/nocturnal development of suicidal ideas. She verbalizes understanding.  -We will see Angy Grubbs back in 1week after the sleep study to go over the sleep study results and further management options.  -She was educated to practice good sleep hygiene practices. She  was provided with a good sleep hygiene hand out.  -Luisito Braga was advised to make earlier appointment with my clinic if she develops any worsening of sleep symptoms. She verbalizes understanding.  -Luisito Braga was advised to not to drive any motor vehicles or operate heavy equipment until her sleep symptoms are under good control. Angy Grubbs verbalizes understanding.  - Angy Grubbs was educated about my impression and plan. She verbalizes understanding.

## 2018-11-09 ENCOUNTER — INITIAL CONSULT (OUTPATIENT)
Dept: PULMONOLOGY | Age: 23
End: 2018-11-09
Payer: COMMERCIAL

## 2018-11-09 VITALS
HEART RATE: 79 BPM | TEMPERATURE: 98.5 F | SYSTOLIC BLOOD PRESSURE: 100 MMHG | OXYGEN SATURATION: 98 % | WEIGHT: 154 LBS | DIASTOLIC BLOOD PRESSURE: 80 MMHG | HEIGHT: 66 IN | BODY MASS INDEX: 24.75 KG/M2

## 2018-11-09 DIAGNOSIS — F41.9 ANXIETY DISORDER, UNSPECIFIED TYPE: ICD-10-CM

## 2018-11-09 DIAGNOSIS — G47.10 HYPERSOMNIA: ICD-10-CM

## 2018-11-09 DIAGNOSIS — R06.81 APNEA: Primary | ICD-10-CM

## 2018-11-09 PROCEDURE — G8427 DOCREV CUR MEDS BY ELIG CLIN: HCPCS | Performed by: INTERNAL MEDICINE

## 2018-11-09 PROCEDURE — 1036F TOBACCO NON-USER: CPT | Performed by: INTERNAL MEDICINE

## 2018-11-09 PROCEDURE — G8420 CALC BMI NORM PARAMETERS: HCPCS | Performed by: INTERNAL MEDICINE

## 2018-11-09 PROCEDURE — G8484 FLU IMMUNIZE NO ADMIN: HCPCS | Performed by: INTERNAL MEDICINE

## 2018-11-09 PROCEDURE — 99204 OFFICE O/P NEW MOD 45 MIN: CPT | Performed by: INTERNAL MEDICINE

## 2018-11-15 NOTE — PROGRESS NOTES
Left message with the following instructions:    NPO after midnight  Bring insurance info and drivers license  Wear comfortable clean clothing  Do not bring jewelry  Shower night before and morning of surgery with a liquid antibacterial soap  Bring list of medications with dosage and how often taken  Follow all instructions given by your physician   needed at discharge  Call [phone] for any questions

## 2018-11-16 ENCOUNTER — HOSPITAL ENCOUNTER (OUTPATIENT)
Age: 23
Setting detail: OUTPATIENT SURGERY
Discharge: HOME | End: 2018-11-16
Attending: OBSTETRICS & GYNECOLOGY | Admitting: OBSTETRICS & GYNECOLOGY
Payer: COMMERCIAL

## 2018-11-16 ENCOUNTER — ANESTHESIA (OUTPATIENT)
Dept: OPERATING ROOM | Age: 23
End: 2018-11-16
Payer: COMMERCIAL

## 2018-11-16 ENCOUNTER — ANESTHESIA EVENT (OUTPATIENT)
Dept: OPERATING ROOM | Age: 23
End: 2018-11-16
Payer: COMMERCIAL

## 2018-11-16 VITALS
RESPIRATION RATE: 8 BRPM | TEMPERATURE: 97.7 F | DIASTOLIC BLOOD PRESSURE: 59 MMHG | SYSTOLIC BLOOD PRESSURE: 103 MMHG | OXYGEN SATURATION: 98 %

## 2018-11-16 VITALS
TEMPERATURE: 98.2 F | OXYGEN SATURATION: 95 % | RESPIRATION RATE: 22 BRPM | HEART RATE: 109 BPM | SYSTOLIC BLOOD PRESSURE: 106 MMHG | DIASTOLIC BLOOD PRESSURE: 62 MMHG

## 2018-11-16 DIAGNOSIS — G89.18 POST-OP PAIN: Primary | ICD-10-CM

## 2018-11-16 PROBLEM — N73.6 PELVIC PERITONEAL ADHESIONS, FEMALE: Status: ACTIVE | Noted: 2018-11-16

## 2018-11-16 PROBLEM — R10.2 PELVIC PAIN IN FEMALE: Status: ACTIVE | Noted: 2018-11-16

## 2018-11-16 PROBLEM — N94.6 DYSMENORRHEA: Status: ACTIVE | Noted: 2018-11-16

## 2018-11-16 LAB — PREGNANCY, URINE: NEGATIVE

## 2018-11-16 PROCEDURE — 7100000010 HC PHASE II RECOVERY - FIRST 15 MIN: Performed by: OBSTETRICS & GYNECOLOGY

## 2018-11-16 PROCEDURE — 6370000000 HC RX 637 (ALT 250 FOR IP): Performed by: NURSE ANESTHETIST, CERTIFIED REGISTERED

## 2018-11-16 PROCEDURE — 2709999900 HC NON-CHARGEABLE SUPPLY: Performed by: OBSTETRICS & GYNECOLOGY

## 2018-11-16 PROCEDURE — 2580000003 HC RX 258: Performed by: NURSE ANESTHETIST, CERTIFIED REGISTERED

## 2018-11-16 PROCEDURE — 7100000000 HC PACU RECOVERY - FIRST 15 MIN: Performed by: OBSTETRICS & GYNECOLOGY

## 2018-11-16 PROCEDURE — 3700000001 HC ADD 15 MINUTES (ANESTHESIA): Performed by: OBSTETRICS & GYNECOLOGY

## 2018-11-16 PROCEDURE — 2500000003 HC RX 250 WO HCPCS: Performed by: NURSE ANESTHETIST, CERTIFIED REGISTERED

## 2018-11-16 PROCEDURE — 81025 URINE PREGNANCY TEST: CPT

## 2018-11-16 PROCEDURE — 6360000002 HC RX W HCPCS: Performed by: NURSE ANESTHETIST, CERTIFIED REGISTERED

## 2018-11-16 PROCEDURE — 6370000000 HC RX 637 (ALT 250 FOR IP): Performed by: OBSTETRICS & GYNECOLOGY

## 2018-11-16 PROCEDURE — 88305 TISSUE EXAM BY PATHOLOGIST: CPT

## 2018-11-16 PROCEDURE — 3700000000 HC ANESTHESIA ATTENDED CARE: Performed by: OBSTETRICS & GYNECOLOGY

## 2018-11-16 PROCEDURE — 7100000011 HC PHASE II RECOVERY - ADDTL 15 MIN: Performed by: OBSTETRICS & GYNECOLOGY

## 2018-11-16 PROCEDURE — 2580000003 HC RX 258: Performed by: OBSTETRICS & GYNECOLOGY

## 2018-11-16 PROCEDURE — 3600000013 HC SURGERY LEVEL 3 ADDTL 15MIN: Performed by: OBSTETRICS & GYNECOLOGY

## 2018-11-16 PROCEDURE — 3600000003 HC SURGERY LEVEL 3 BASE: Performed by: OBSTETRICS & GYNECOLOGY

## 2018-11-16 PROCEDURE — 7100000001 HC PACU RECOVERY - ADDTL 15 MIN: Performed by: OBSTETRICS & GYNECOLOGY

## 2018-11-16 PROCEDURE — 6360000002 HC RX W HCPCS: Performed by: ANESTHESIOLOGY

## 2018-11-16 RX ORDER — SODIUM CHLORIDE 0.9 % (FLUSH) 0.9 %
10 SYRINGE (ML) INJECTION PRN
Status: DISCONTINUED | OUTPATIENT
Start: 2018-11-16 | End: 2018-11-16 | Stop reason: HOSPADM

## 2018-11-16 RX ORDER — SODIUM CHLORIDE 9 MG/ML
INJECTION, SOLUTION INTRAVENOUS CONTINUOUS PRN
Status: DISCONTINUED | OUTPATIENT
Start: 2018-11-16 | End: 2018-11-16 | Stop reason: SDUPTHER

## 2018-11-16 RX ORDER — ONDANSETRON 2 MG/ML
INJECTION INTRAMUSCULAR; INTRAVENOUS PRN
Status: DISCONTINUED | OUTPATIENT
Start: 2018-11-16 | End: 2018-11-16 | Stop reason: SDUPTHER

## 2018-11-16 RX ORDER — MEPERIDINE HYDROCHLORIDE 25 MG/ML
INJECTION INTRAMUSCULAR; INTRAVENOUS; SUBCUTANEOUS
Status: DISCONTINUED
Start: 2018-11-16 | End: 2018-11-16 | Stop reason: HOSPADM

## 2018-11-16 RX ORDER — SODIUM CHLORIDE 0.9 % (FLUSH) 0.9 %
10 SYRINGE (ML) INJECTION EVERY 12 HOURS SCHEDULED
Status: DISCONTINUED | OUTPATIENT
Start: 2018-11-16 | End: 2018-11-16 | Stop reason: HOSPADM

## 2018-11-16 RX ORDER — ACETAMINOPHEN 325 MG/1
650 TABLET ORAL EVERY 4 HOURS PRN
Status: DISCONTINUED | OUTPATIENT
Start: 2018-11-16 | End: 2018-11-16 | Stop reason: HOSPADM

## 2018-11-16 RX ORDER — ALBUTEROL SULFATE 90 UG/1
AEROSOL, METERED RESPIRATORY (INHALATION) PRN
Status: DISCONTINUED | OUTPATIENT
Start: 2018-11-16 | End: 2018-11-16 | Stop reason: SDUPTHER

## 2018-11-16 RX ORDER — KETOROLAC TROMETHAMINE 30 MG/ML
INJECTION, SOLUTION INTRAMUSCULAR; INTRAVENOUS PRN
Status: DISCONTINUED | OUTPATIENT
Start: 2018-11-16 | End: 2018-11-16 | Stop reason: SDUPTHER

## 2018-11-16 RX ORDER — DOCUSATE SODIUM 100 MG/1
100 CAPSULE, LIQUID FILLED ORAL 2 TIMES DAILY
Status: DISCONTINUED | OUTPATIENT
Start: 2018-11-16 | End: 2018-11-16 | Stop reason: HOSPADM

## 2018-11-16 RX ORDER — MORPHINE SULFATE 2 MG/ML
2 INJECTION, SOLUTION INTRAMUSCULAR; INTRAVENOUS
Status: DISCONTINUED | OUTPATIENT
Start: 2018-11-16 | End: 2018-11-16 | Stop reason: HOSPADM

## 2018-11-16 RX ORDER — GLYCOPYRROLATE 1 MG/5 ML
SYRINGE (ML) INTRAVENOUS PRN
Status: DISCONTINUED | OUTPATIENT
Start: 2018-11-16 | End: 2018-11-16 | Stop reason: SDUPTHER

## 2018-11-16 RX ORDER — PROPOFOL 10 MG/ML
INJECTION, EMULSION INTRAVENOUS PRN
Status: DISCONTINUED | OUTPATIENT
Start: 2018-11-16 | End: 2018-11-16 | Stop reason: SDUPTHER

## 2018-11-16 RX ORDER — OXYCODONE HYDROCHLORIDE AND ACETAMINOPHEN 5; 325 MG/1; MG/1
1 TABLET ORAL EVERY 6 HOURS PRN
Qty: 10 TABLET | Refills: 0 | Status: SHIPPED | OUTPATIENT
Start: 2018-11-16 | End: 2018-11-16 | Stop reason: HOSPADM

## 2018-11-16 RX ORDER — SODIUM CHLORIDE, SODIUM LACTATE, POTASSIUM CHLORIDE, CALCIUM CHLORIDE 600; 310; 30; 20 MG/100ML; MG/100ML; MG/100ML; MG/100ML
INJECTION, SOLUTION INTRAVENOUS CONTINUOUS
Status: DISCONTINUED | OUTPATIENT
Start: 2018-11-16 | End: 2018-11-16 | Stop reason: HOSPADM

## 2018-11-16 RX ORDER — HYDROCODONE BITARTRATE AND ACETAMINOPHEN 5; 325 MG/1; MG/1
1 TABLET ORAL EVERY 4 HOURS PRN
Qty: 10 TABLET | Refills: 0 | Status: SHIPPED | OUTPATIENT
Start: 2018-11-16 | End: 2018-11-16 | Stop reason: HOSPADM

## 2018-11-16 RX ORDER — FENTANYL CITRATE 50 UG/ML
INJECTION, SOLUTION INTRAMUSCULAR; INTRAVENOUS PRN
Status: DISCONTINUED | OUTPATIENT
Start: 2018-11-16 | End: 2018-11-16 | Stop reason: SDUPTHER

## 2018-11-16 RX ORDER — HYDROCODONE BITARTRATE AND ACETAMINOPHEN 5; 325 MG/1; MG/1
2 TABLET ORAL EVERY 4 HOURS PRN
Status: DISCONTINUED | OUTPATIENT
Start: 2018-11-16 | End: 2018-11-16

## 2018-11-16 RX ORDER — OXYCODONE HYDROCHLORIDE AND ACETAMINOPHEN 5; 325 MG/1; MG/1
TABLET ORAL
Status: DISCONTINUED
Start: 2018-11-16 | End: 2018-11-16 | Stop reason: HOSPADM

## 2018-11-16 RX ORDER — DEXAMETHASONE SODIUM PHOSPHATE 4 MG/ML
INJECTION, SOLUTION INTRA-ARTICULAR; INTRALESIONAL; INTRAMUSCULAR; INTRAVENOUS; SOFT TISSUE PRN
Status: DISCONTINUED | OUTPATIENT
Start: 2018-11-16 | End: 2018-11-16 | Stop reason: SDUPTHER

## 2018-11-16 RX ORDER — ONDANSETRON 2 MG/ML
4 INJECTION INTRAMUSCULAR; INTRAVENOUS EVERY 6 HOURS PRN
Status: DISCONTINUED | OUTPATIENT
Start: 2018-11-16 | End: 2018-11-16 | Stop reason: HOSPADM

## 2018-11-16 RX ORDER — MORPHINE SULFATE 2 MG/ML
4 INJECTION, SOLUTION INTRAMUSCULAR; INTRAVENOUS
Status: DISCONTINUED | OUTPATIENT
Start: 2018-11-16 | End: 2018-11-16 | Stop reason: HOSPADM

## 2018-11-16 RX ORDER — IBUPROFEN 600 MG/1
600 TABLET ORAL EVERY 6 HOURS PRN
Qty: 60 TABLET | Refills: 1 | Status: SHIPPED | OUTPATIENT
Start: 2018-11-16 | End: 2019-05-03

## 2018-11-16 RX ORDER — HYDROCODONE BITARTRATE AND ACETAMINOPHEN 5; 325 MG/1; MG/1
1 TABLET ORAL EVERY 4 HOURS PRN
Status: DISCONTINUED | OUTPATIENT
Start: 2018-11-16 | End: 2018-11-16

## 2018-11-16 RX ORDER — OXYCODONE HYDROCHLORIDE AND ACETAMINOPHEN 5; 325 MG/1; MG/1
1 TABLET ORAL ONCE
Status: COMPLETED | OUTPATIENT
Start: 2018-11-16 | End: 2018-11-16

## 2018-11-16 RX ORDER — MEPERIDINE HYDROCHLORIDE 25 MG/ML
12.5 INJECTION INTRAMUSCULAR; INTRAVENOUS; SUBCUTANEOUS EVERY 5 MIN PRN
Status: DISCONTINUED | OUTPATIENT
Start: 2018-11-16 | End: 2018-11-16 | Stop reason: HOSPADM

## 2018-11-16 RX ORDER — LIDOCAINE HYDROCHLORIDE 20 MG/ML
INJECTION, SOLUTION INFILTRATION; PERINEURAL PRN
Status: DISCONTINUED | OUTPATIENT
Start: 2018-11-16 | End: 2018-11-16 | Stop reason: SDUPTHER

## 2018-11-16 RX ORDER — OXYCODONE HYDROCHLORIDE AND ACETAMINOPHEN 5; 325 MG/1; MG/1
1 TABLET ORAL EVERY 6 HOURS PRN
Qty: 10 TABLET | Refills: 0 | Status: SHIPPED | OUTPATIENT
Start: 2018-11-16 | End: 2018-11-19

## 2018-11-16 RX ORDER — ROCURONIUM BROMIDE 10 MG/ML
INJECTION, SOLUTION INTRAVENOUS PRN
Status: DISCONTINUED | OUTPATIENT
Start: 2018-11-16 | End: 2018-11-16 | Stop reason: SDUPTHER

## 2018-11-16 RX ADMIN — PROPOFOL 170 MG: 10 INJECTION, EMULSION INTRAVENOUS at 13:42

## 2018-11-16 RX ADMIN — KETOROLAC TROMETHAMINE 30 MG: 30 INJECTION, SOLUTION INTRAMUSCULAR; INTRAVENOUS at 14:32

## 2018-11-16 RX ADMIN — SUGAMMADEX 200 MG: 100 INJECTION, SOLUTION INTRAVENOUS at 14:33

## 2018-11-16 RX ADMIN — FENTANYL CITRATE 50 MCG: 50 INJECTION INTRAMUSCULAR; INTRAVENOUS at 14:34

## 2018-11-16 RX ADMIN — OXYCODONE HYDROCHLORIDE AND ACETAMINOPHEN 1 TABLET: 5; 325 TABLET ORAL at 15:39

## 2018-11-16 RX ADMIN — SODIUM CHLORIDE: 9 INJECTION, SOLUTION INTRAVENOUS at 13:40

## 2018-11-16 RX ADMIN — FENTANYL CITRATE 100 MCG: 50 INJECTION INTRAMUSCULAR; INTRAVENOUS at 13:41

## 2018-11-16 RX ADMIN — ROCURONIUM BROMIDE 30 MG: 10 INJECTION INTRAVENOUS at 13:42

## 2018-11-16 RX ADMIN — LIDOCAINE HYDROCHLORIDE 80 MG: 20 INJECTION, SOLUTION INFILTRATION; PERINEURAL at 13:42

## 2018-11-16 RX ADMIN — MEPERIDINE HYDROCHLORIDE 12.5 MG: 25 INJECTION INTRAMUSCULAR; INTRAVENOUS; SUBCUTANEOUS at 15:11

## 2018-11-16 RX ADMIN — ALBUTEROL SULFATE 2 PUFF: 90 AEROSOL, METERED RESPIRATORY (INHALATION) at 14:36

## 2018-11-16 RX ADMIN — Medication 0.2 MG: at 14:03

## 2018-11-16 RX ADMIN — ALBUTEROL SULFATE 2 PUFF: 90 AEROSOL, METERED RESPIRATORY (INHALATION) at 13:46

## 2018-11-16 RX ADMIN — ONDANSETRON HYDROCHLORIDE 4 MG: 4 INJECTION, SOLUTION INTRAMUSCULAR; INTRAVENOUS at 13:49

## 2018-11-16 RX ADMIN — DEXAMETHASONE SODIUM PHOSPHATE 10 MG: 4 INJECTION, SOLUTION INTRAMUSCULAR; INTRAVENOUS at 13:49

## 2018-11-16 RX ADMIN — FENTANYL CITRATE 50 MCG: 50 INJECTION INTRAMUSCULAR; INTRAVENOUS at 14:54

## 2018-11-16 RX ADMIN — SODIUM CHLORIDE: 9 INJECTION, SOLUTION INTRAVENOUS at 14:30

## 2018-11-16 ASSESSMENT — PULMONARY FUNCTION TESTS
PIF_VALUE: 9
PIF_VALUE: 2
PIF_VALUE: 3
PIF_VALUE: 16
PIF_VALUE: 18
PIF_VALUE: 14
PIF_VALUE: 20
PIF_VALUE: 18
PIF_VALUE: 12
PIF_VALUE: 12
PIF_VALUE: 20
PIF_VALUE: 14
PIF_VALUE: 1
PIF_VALUE: 9
PIF_VALUE: 2
PIF_VALUE: 11
PIF_VALUE: 12
PIF_VALUE: 18
PIF_VALUE: 3
PIF_VALUE: 12
PIF_VALUE: 0
PIF_VALUE: 2
PIF_VALUE: 1
PIF_VALUE: 12
PIF_VALUE: 0
PIF_VALUE: 17
PIF_VALUE: 13
PIF_VALUE: 17
PIF_VALUE: 2
PIF_VALUE: 12
PIF_VALUE: 11
PIF_VALUE: 2
PIF_VALUE: 12
PIF_VALUE: 15
PIF_VALUE: 5
PIF_VALUE: 13
PIF_VALUE: 11
PIF_VALUE: 16
PIF_VALUE: 8
PIF_VALUE: 12
PIF_VALUE: 18
PIF_VALUE: 13
PIF_VALUE: 19
PIF_VALUE: 1
PIF_VALUE: 12
PIF_VALUE: 12
PIF_VALUE: 2
PIF_VALUE: 16
PIF_VALUE: 18
PIF_VALUE: 13
PIF_VALUE: 17
PIF_VALUE: 12
PIF_VALUE: 12
PIF_VALUE: 16
PIF_VALUE: 2
PIF_VALUE: 12
PIF_VALUE: 16
PIF_VALUE: 16
PIF_VALUE: 12
PIF_VALUE: 16
PIF_VALUE: 2
PIF_VALUE: 15
PIF_VALUE: 12
PIF_VALUE: 17
PIF_VALUE: 1
PIF_VALUE: 16
PIF_VALUE: 2

## 2018-11-16 ASSESSMENT — PAIN SCALES - GENERAL
PAINLEVEL_OUTOF10: 0
PAINLEVEL_OUTOF10: 6
PAINLEVEL_OUTOF10: 9

## 2018-11-16 ASSESSMENT — PAIN DESCRIPTION - DESCRIPTORS: DESCRIPTORS: CONSTANT;ACHING;STABBING

## 2018-11-16 ASSESSMENT — PAIN - FUNCTIONAL ASSESSMENT: PAIN_FUNCTIONAL_ASSESSMENT: 0-10

## 2018-11-16 NOTE — OP NOTE
Alessio Carrion 62                                    [place], [place]                                   RECORD OF OPERATION       PREOPERATIVE DIAGNOSES:  Pelvic pain, dysmenorrhea, dyspareunia      POSTOPERATIVE DIAGNOSES:  Same, omental anomaly, pelvic adhesion     PROCEDURE:     Diagnostic laparoscopy,  Hysterosocopy with dilation and curettage     SURGEON:  Dr Peyton Lora     ANESTHESIA:  General.     ESTIMATED BLOOD LOSS:  10 ml. COMPLICATIONS:  None. SPECIMENS:  Omental tissue     FINDINGS:   Loop of omental tissue was noted approx 8 in in length. Normal uterus, tubes and ovaries other than filmy adhesions of the left ovary to the left ovarian fossa. Normal liver, GB stomach, and appy noted. No evidence of endometriosis. On hysteroscopy, non-enlarged uterus with no endometrial polyps or fibroids       PROCEDURE:  After signing informed consent, the patient was taken to the Operating Room and placed in a supine position and given general anesthesia. She was then placed in the dorsal lithotomy position and prepped and draped in the normal sterile fashion. A weighted speculum was placed without difficulty and the anterior lip of the cervix was grasped with a single-tooth teneculum. Cervix was serially dilated to [address] [place] with the use of river dilators. The hysteroscope was introduced with lactated ringer's as a distending medium and findings as noted above. Sharp curettage was performed and tissue was sent to pathology for permanent evaluation. Hemostasis was assured. A manipulator was placed in the endocervical canal for manipulation of the uterus. All other instruments were removed from the vagina. Attention was placed to the abdomen. A 5 mm incision was made approximately in the umbilicus in the midline position. A 5 mm trocar was placed  under direct visualization. The abdomen was inflated with CO2 gas.  A 5mm suprapubic incision was made and a 5 mm

## 2018-11-16 NOTE — ANESTHESIA PRE PROCEDURE
Problem List:    Patient Active Problem List   Diagnosis Code    Concussion S06. 0X9A    Headache R51    Post concussion syndrome F07.81    Anxiety F41.9    Hypoxia R09.02       Past Medical History:        Diagnosis Date    Camptodactyly     Fatigue     GERD (gastroesophageal reflux disease)     Headache(784.0)     Hormone disorder     over active prolactin hormone    Septal defect     heart defect       Past Surgical History:        Procedure Laterality Date    CHOLECYSTECTOMY  2018    collapsed lung during extubation. Hosp X 2 days after    FINGER SURGERY Left 2004    5th digit straightening due to camptodactyly - , [facility]    FINGER SURGERY      6 finger straightenings with Dr. Floyd Marrero @ [facility] ARTHROSCOPY  2011    [facility] - Dr. Nicola Herman    [place] LAP,CHOLECYSTECTOMY N/A 3/8/2018    CHOLECYSTECTOMY LAPAROSCOPIC, POSS OPEN performed by Jannie Norwood DO at [address] [facility] ARTHROSCOPY Right     TONSILLECTOMY AND ADENOIDECTOMY      WISDOM TOOTH EXTRACTION  01/2018       Social History:    Social History   Substance Use Topics    Smoking status: Never Smoker    Smokeless tobacco: Never Used    Alcohol use No                                Counseling given: Not Answered      Vital Signs (Current): There were no vitals filed for this visit.                                            BP Readings from Last 3 Encounters:   11/16/18 111/72   11/09/18 100/80   08/01/18 (!) 92/53       NPO Status:                                                                                 BMI:   Wt Readings from Last 3 Encounters:   11/09/18 154 lb (69.9 kg)   07/31/18 152 lb (68.9 kg)   04/10/18 152 lb (68.9 kg)     There is no height or weight on file to calculate BMI.    CBC:   Lab Results   Component Value Date    WBC 7.9 07/31/2018    RBC 4.29 07/31/2018    RBC 4.44 03/30/2012    HGB 13.2 07/31/2018    HCT 39.3 07/31/2018    MCV 91.6 07/31/2018    RDW 12.5 03/08/2018     07/31/2018

## 2018-11-16 NOTE — PROGRESS NOTES
1451-  Patient arrived to pacu via cart to bay 1. Spontaneous respirations even and unlabored. Placed on monitor-- , will monitor. All other VSS. 1452-  Assessment completed. Patient is drowsy, but responds to name. Patient denies pain--will monitor. IV infusing 0.9 in left wrist-- no complications. Abdomen soft and non-distended. See incision charting. Geri-pad in place-- no drainage present. 80-  Dr. Floyd Marrero at bedside and aware of elevated HR. No new orders at this time. 1503-  Ice pack applied to abdomen. 1506-  Patient states she is having pain. Dr. Floyd Marrero at bedside and states patient may have 12.5mg of Demerol and dose may be repeated if needed. 1511-  12.5mg of Demerol given. See MAR.   0082-  Respirations even and unlabored. VSS. Patient tolerating ice chips. Denies N/V.   1520-  Patient states [address] [facility] does not work for her. Dr. Jerrel Hashimoto states she will order Percocet. 1525-  Respirations even and unlabored. VSS. 1531-  Reassessment completed. Patient meets criteria to be moved to phase II.    1535-  Snack and drink given to patient. Family brought to room. 1539-  1 Percocet given. See MAR.    4264-  Belongings brought to patient. 1555-  Patient doing well. No needs at this time. 1605-  Patient states pain has improved, but she is still sore. 1617-  Discharge instructions given. Understanding verbalized. 1620-  Patient up to bathroom and voided. Moderate amount of blood on pad.   1627-  Report given to [employer].

## 2018-12-18 ENCOUNTER — HOSPITAL ENCOUNTER (OUTPATIENT)
Dept: SLEEP CENTER | Age: 23
Discharge: HOME | End: 2018-12-20
Payer: COMMERCIAL

## 2018-12-18 DIAGNOSIS — G47.10 HYPERSOMNIA: ICD-10-CM

## 2018-12-18 DIAGNOSIS — R06.81 APNEA: ICD-10-CM

## 2018-12-18 DIAGNOSIS — F41.9 ANXIETY DISORDER, UNSPECIFIED TYPE: ICD-10-CM

## 2018-12-18 LAB — STATUS: NORMAL

## 2018-12-19 NOTE — PROGRESS NOTES
[address] [place] [address]                               SLEEP STUDY REPORT    PATIENT NAME: Negro FERNANDEZ                  :        1995  MED REC NO:   [id number]                           ROOM:  ACCOUNT NO:   [de-identified]                           ADMIT DATE: 2018  PROVIDER:     Markus Ordonez. MD Becki    DATE OF STUDY:  2018    MULTIPLE SLEEP LATENCY TEST REPORT    REFERRING PROVIDER:  Les Bhatti M.D. HISTORY:  The patient is a 82-year-old female, was initially evaluated  by me on 2018. The patient is currently suffering with  hypersomnia with Roland Sleepiness Score of 16. The patient is  scheduled for baseline polysomnogram with parasomnia montage followed by  MSLT test to further evaluate for etiology of hypersomnia. The  patient's baseline sleep study did not reveal any significant sleep  apnea. Hence, the patient proceeded with MSLT test.    METHODS:  The MSLT test was performed by using attended comprehensive  polysomnogram.  During MSLT test, the patient was monitored with  electroencephalogram, electrooculogram, electromyogram and EKG  monitoring. The recorded data was scored by using published guidelines. INTERPRETATION:  During multiple sleep latency test, the patient was  give an opportunity for five naps. NAP 1:  Nap 1 was started at 06:59 a.m. and was terminated at 07:25 a.m.  with the total time in bed of 26.17 minutes and total sleep time was  13.15 minutes and sleep onset latency was 12.01 minutes. No sleep onset  REM was noted. NAP 2:  Nap 2 was started at 08:57 a.m. and was terminated at 09:24 a.m.  with the total time in bed of 26.29 minutes, total sleep time was 13.46  minutes and sleep onset latency was 11.12 minutes. No sleep onset REM  was noted.     NAP 3:  Nap 3 was started at 10:56 a.m. and was terminated at 11:20 a.m.  with the total time in bed of 24.29 minutes, total sleep time was 14.04  minutes and sleep onset latency was 9.25 minutes. No sleep onset REM  was noted. NAP 4:  Nap 4 was started at 12:58 p.m. and was terminated at 01:29 p.m.  with the total time in bed of 31.17 minutes, total sleep time was 14.34  minutes and sleep onset latency was 16.13 minutes. No sleep onset REM  was noted. NAP 5:  Nap 5 was started at 02:53 p.m. and was terminated at 03:25 p.m.  and the total time in bed was 31.5 minutes, total sleep time was 15.06  minute and sleep onset latency was 16.44 minutes. No sleep onset REM  was noted. The mean sleep latency for all five naps was 13.07 minutes and no sleep  onset REM was noted during the five naps. IMPRESSION:  The multiple sleep latency test is normal.    RECOMMENDATIONS:  1. The patient should be scheduled for a followup in my clinic as soon  as possible to discuss about the sleep study finding for further  management. 2.  Please follow urine toxicology screen, which was sent on the day of  testing. Thanks to Dr. Robby Zazueta for giving me this opportunity to participate  in the care of this pleasant lady.         Cal Joy MD    D: 12/18/2018 21:05:55       T: 12/19/2018 0:02:26     [email or web address]  Job#: [id number]     Doc#: [id number]    CC:

## 2019-01-09 ENCOUNTER — OFFICE VISIT (OUTPATIENT)
Dept: PULMONOLOGY | Age: 24
End: 2019-01-09
Payer: COMMERCIAL

## 2019-01-09 VITALS
SYSTOLIC BLOOD PRESSURE: 116 MMHG | HEIGHT: 66 IN | OXYGEN SATURATION: 99 % | DIASTOLIC BLOOD PRESSURE: 68 MMHG | HEART RATE: 87 BPM | BODY MASS INDEX: 24.49 KG/M2 | WEIGHT: 152.4 LBS

## 2019-01-09 DIAGNOSIS — G47.11 IDIOPATHIC HYPERSOMNIA: Primary | ICD-10-CM

## 2019-01-09 DIAGNOSIS — G47.19 EXCESSIVE DAYTIME SLEEPINESS: ICD-10-CM

## 2019-01-09 DIAGNOSIS — G47.8 NON-RESTORATIVE SLEEP: ICD-10-CM

## 2019-01-09 PROBLEM — G47.10 HYPERSOMNIA: Status: ACTIVE | Noted: 2019-01-09

## 2019-01-09 PROCEDURE — G8484 FLU IMMUNIZE NO ADMIN: HCPCS | Performed by: PHYSICIAN ASSISTANT

## 2019-01-09 PROCEDURE — 99214 OFFICE O/P EST MOD 30 MIN: CPT | Performed by: PHYSICIAN ASSISTANT

## 2019-01-09 PROCEDURE — G8427 DOCREV CUR MEDS BY ELIG CLIN: HCPCS | Performed by: PHYSICIAN ASSISTANT

## 2019-01-09 PROCEDURE — G8420 CALC BMI NORM PARAMETERS: HCPCS | Performed by: PHYSICIAN ASSISTANT

## 2019-01-09 PROCEDURE — 1036F TOBACCO NON-USER: CPT | Performed by: PHYSICIAN ASSISTANT

## 2019-01-09 RX ORDER — MODAFINIL 100 MG/1
100 TABLET ORAL DAILY
Qty: 30 TABLET | Refills: 0 | Status: SHIPPED | OUTPATIENT
Start: 2019-01-09 | End: 2019-02-11 | Stop reason: SDUPTHER

## 2019-01-09 ASSESSMENT — ENCOUNTER SYMPTOMS
ALLERGIC/IMMUNOLOGIC NEGATIVE: 1
STRIDOR: 0
NAUSEA: 0
EYES NEGATIVE: 1
COUGH: 0
SHORTNESS OF BREATH: 0
BACK PAIN: 0
DIARRHEA: 0
WHEEZING: 0
CHEST TIGHTNESS: 0

## 2019-02-11 DIAGNOSIS — G47.19 EXCESSIVE DAYTIME SLEEPINESS: ICD-10-CM

## 2019-02-11 DIAGNOSIS — G47.8 NON-RESTORATIVE SLEEP: ICD-10-CM

## 2019-02-11 DIAGNOSIS — G47.11 IDIOPATHIC HYPERSOMNIA: ICD-10-CM

## 2019-02-12 ENCOUNTER — TELEPHONE (OUTPATIENT)
Dept: PULMONOLOGY | Age: 24
End: 2019-02-12

## 2019-02-12 RX ORDER — MODAFINIL 100 MG/1
100 TABLET ORAL DAILY
Qty: 30 TABLET | Refills: 0 | Status: SHIPPED | OUTPATIENT
Start: 2019-02-12 | End: 2019-03-12

## 2019-02-28 LAB
ABSOLUTE BASO #: 100 /CMM (ref 0–200)
ABSOLUTE EOS #: 100 /CMM (ref 0–500)
ABSOLUTE LYMPH #: 3800 /CMM (ref 1000–4800)
ABSOLUTE MONO #: 500 /CMM (ref 0–800)
ABSOLUTE NEUT #: 3000 /CMM (ref 1800–7700)
BASOPHILS RELATIVE PERCENT: 1 % (ref 0–2)
EOSINOPHILS RELATIVE PERCENT: 1.1 % (ref 0–6)
FOLATE: > 23.6 NG/ML
HCT VFR BLD CALC: 38.4 % (ref 35–44)
HEMOGLOBIN: 13 GM/DL (ref 12–15)
LYMPHOCYTES RELATIVE PERCENT: 50.1 % (ref 15–45)
MCH RBC QN AUTO: 30.7 PG (ref 27.5–33)
MCHC RBC AUTO-ENTMCNC: 33.8 GM/DL (ref 33–36)
MCV RBC AUTO: 91 CU MIC (ref 80–97)
MONOCYTES RELATIVE PERCENT: 7.3 % (ref 2–10)
NEUTROPHILS RELATIVE PERCENT: 40.5 % (ref 40–70)
NUCLEATED RBCS: 0.1 /100 WBC
PDW BLD-RTO: 12.7 % (ref 12–16)
PLATELET # BLD: 256 TH/CMM (ref 150–400)
RBC # BLD: 4.22 MIL/CMM (ref 4–5.1)
TSH REFLEX: 1.35 MCIU/ML (ref 0.49–4.67)
VITAMIN B-12: 761 PG/ML (ref 180–914)
VITAMIN D 25-HYDROXY: 31.38 NG/ML (ref 30–100)
WBC # BLD: 7.5 TH/CMM (ref 4.4–10.5)

## 2019-03-04 ENCOUNTER — TELEPHONE (OUTPATIENT)
Dept: PULMONOLOGY | Age: 24
End: 2019-03-04

## 2019-03-12 ENCOUNTER — OFFICE VISIT (OUTPATIENT)
Dept: PULMONOLOGY | Age: 24
End: 2019-03-12
Payer: COMMERCIAL

## 2019-03-12 VITALS
OXYGEN SATURATION: 98 % | WEIGHT: 154 LBS | BODY MASS INDEX: 24.75 KG/M2 | DIASTOLIC BLOOD PRESSURE: 60 MMHG | SYSTOLIC BLOOD PRESSURE: 118 MMHG | HEART RATE: 88 BPM | HEIGHT: 66 IN

## 2019-03-12 DIAGNOSIS — G47.11 IDIOPATHIC HYPERSOMNIA: Primary | ICD-10-CM

## 2019-03-12 PROCEDURE — G8484 FLU IMMUNIZE NO ADMIN: HCPCS | Performed by: PHYSICIAN ASSISTANT

## 2019-03-12 PROCEDURE — 1036F TOBACCO NON-USER: CPT | Performed by: PHYSICIAN ASSISTANT

## 2019-03-12 PROCEDURE — G8427 DOCREV CUR MEDS BY ELIG CLIN: HCPCS | Performed by: PHYSICIAN ASSISTANT

## 2019-03-12 PROCEDURE — 99213 OFFICE O/P EST LOW 20 MIN: CPT | Performed by: PHYSICIAN ASSISTANT

## 2019-03-12 PROCEDURE — G8420 CALC BMI NORM PARAMETERS: HCPCS | Performed by: PHYSICIAN ASSISTANT

## 2019-03-12 RX ORDER — ARMODAFINIL 150 MG/1
150 TABLET ORAL DAILY
Qty: 30 TABLET | Refills: 1 | Status: SHIPPED | OUTPATIENT
Start: 2019-03-12 | End: 2019-05-03 | Stop reason: ALTCHOICE

## 2019-04-11 ENCOUNTER — HOSPITAL ENCOUNTER (EMERGENCY)
Age: 24
Discharge: HOME | End: 2019-04-11
Payer: COMMERCIAL

## 2019-04-11 VITALS
SYSTOLIC BLOOD PRESSURE: 107 MMHG | TEMPERATURE: 98 F | BODY MASS INDEX: 24.11 KG/M2 | HEIGHT: 66 IN | WEIGHT: 150 LBS | DIASTOLIC BLOOD PRESSURE: 84 MMHG | OXYGEN SATURATION: 98 % | HEART RATE: 78 BPM | RESPIRATION RATE: 18 BRPM

## 2019-04-11 DIAGNOSIS — R21 RASH DUE TO ALLERGY: Primary | ICD-10-CM

## 2019-04-11 DIAGNOSIS — T78.40XA RASH DUE TO ALLERGY: Primary | ICD-10-CM

## 2019-04-11 LAB
ALBUMIN SERPL-MCNC: 4.8 G/DL (ref 3.5–5.1)
ALP BLD-CCNC: 74 U/L (ref 38–126)
ALT SERPL-CCNC: 12 U/L (ref 11–66)
ANION GAP SERPL CALCULATED.3IONS-SCNC: 13 MEQ/L (ref 8–16)
AST SERPL-CCNC: 21 U/L (ref 5–40)
BASOPHILS # BLD: 0.9 %
BASOPHILS ABSOLUTE: 0.1 THOU/MM3 (ref 0–0.1)
BILIRUB SERPL-MCNC: 0.3 MG/DL (ref 0.3–1.2)
BUN BLDV-MCNC: 18 MG/DL (ref 7–22)
CALCIUM SERPL-MCNC: 9.5 MG/DL (ref 8.5–10.5)
CHLORIDE BLD-SCNC: 103 MEQ/L (ref 98–111)
CO2: 25 MEQ/L (ref 23–33)
CREAT SERPL-MCNC: 0.7 MG/DL (ref 0.4–1.2)
EOSINOPHIL # BLD: 1.6 %
EOSINOPHILS ABSOLUTE: 0.1 THOU/MM3 (ref 0–0.4)
ERYTHROCYTE [DISTWIDTH] IN BLOOD BY AUTOMATED COUNT: 11.9 % (ref 11.5–14.5)
ERYTHROCYTE [DISTWIDTH] IN BLOOD BY AUTOMATED COUNT: 40 FL (ref 35–45)
GFR SERPL CREATININE-BSD FRML MDRD: > 90 ML/MIN/1.73M2
GLUCOSE BLD-MCNC: 83 MG/DL (ref 70–108)
GROUP A STREP CULTURE, REFLEX: NEGATIVE
HCT VFR BLD CALC: 41.1 % (ref 37–47)
HEMOGLOBIN: 13.5 GM/DL (ref 12–16)
IMMATURE GRANS (ABS): 0.01 THOU/MM3 (ref 0–0.07)
IMMATURE GRANULOCYTES: 0.1 %
LYMPHOCYTES # BLD: 51.8 %
LYMPHOCYTES ABSOLUTE: 3.6 THOU/MM3 (ref 1–4.8)
MCH RBC QN AUTO: 30.2 PG (ref 26–33)
MCHC RBC AUTO-ENTMCNC: 32.8 GM/DL (ref 32.2–35.5)
MCV RBC AUTO: 91.9 FL (ref 81–99)
MONOCYTES # BLD: 7.1 %
MONOCYTES ABSOLUTE: 0.5 THOU/MM3 (ref 0.4–1.3)
NUCLEATED RED BLOOD CELLS: 0 /100 WBC
OSMOLALITY CALCULATION: 282.3 MOSMOL/KG (ref 275–300)
PLATELET # BLD: 239 THOU/MM3 (ref 130–400)
PMV BLD AUTO: 9.2 FL (ref 9.4–12.4)
POTASSIUM SERPL-SCNC: 3.9 MEQ/L (ref 3.5–5.2)
RBC # BLD: 4.47 MILL/MM3 (ref 4.2–5.4)
REFLEX THROAT C + S: NORMAL
SEG NEUTROPHILS: 38.5 %
SEGMENTED NEUTROPHILS ABSOLUTE COUNT: 2.7 THOU/MM3 (ref 1.8–7.7)
SODIUM BLD-SCNC: 141 MEQ/L (ref 135–145)
TOTAL PROTEIN: 7.6 G/DL (ref 6.1–8)
WBC # BLD: 7 THOU/MM3 (ref 4.8–10.8)

## 2019-04-11 PROCEDURE — 36415 COLL VENOUS BLD VENIPUNCTURE: CPT

## 2019-04-11 PROCEDURE — 80053 COMPREHEN METABOLIC PANEL: CPT

## 2019-04-11 PROCEDURE — 6360000002 HC RX W HCPCS: Performed by: PHYSICIAN ASSISTANT

## 2019-04-11 PROCEDURE — 96375 TX/PRO/DX INJ NEW DRUG ADDON: CPT

## 2019-04-11 PROCEDURE — 99283 EMERGENCY DEPT VISIT LOW MDM: CPT

## 2019-04-11 PROCEDURE — 87880 STREP A ASSAY W/OPTIC: CPT

## 2019-04-11 PROCEDURE — 2709999900 HC NON-CHARGEABLE SUPPLY

## 2019-04-11 PROCEDURE — 85025 COMPLETE CBC W/AUTO DIFF WBC: CPT

## 2019-04-11 PROCEDURE — 96374 THER/PROPH/DIAG INJ IV PUSH: CPT

## 2019-04-11 PROCEDURE — 87070 CULTURE OTHR SPECIMN AEROBIC: CPT

## 2019-04-11 RX ORDER — DIPHENHYDRAMINE HYDROCHLORIDE 50 MG/ML
25 INJECTION INTRAMUSCULAR; INTRAVENOUS ONCE
Status: COMPLETED | OUTPATIENT
Start: 2019-04-11 | End: 2019-04-11

## 2019-04-11 RX ORDER — METHYLPREDNISOLONE SODIUM SUCCINATE 125 MG/2ML
125 INJECTION, POWDER, LYOPHILIZED, FOR SOLUTION INTRAMUSCULAR; INTRAVENOUS ONCE
Status: COMPLETED | OUTPATIENT
Start: 2019-04-11 | End: 2019-04-11

## 2019-04-11 RX ORDER — PREDNISONE 20 MG/1
TABLET ORAL
Qty: 15 TABLET | Refills: 0 | Status: SHIPPED | OUTPATIENT
Start: 2019-04-11 | End: 2019-05-03 | Stop reason: ALTCHOICE

## 2019-04-11 RX ADMIN — DIPHENHYDRAMINE HYDROCHLORIDE 25 MG: 50 INJECTION, SOLUTION INTRAMUSCULAR; INTRAVENOUS at 10:57

## 2019-04-11 RX ADMIN — METHYLPREDNISOLONE SODIUM SUCCINATE 125 MG: 125 INJECTION, POWDER, FOR SOLUTION INTRAMUSCULAR; INTRAVENOUS at 10:57

## 2019-04-11 ASSESSMENT — ENCOUNTER SYMPTOMS
DIARRHEA: 0
COUGH: 0
SHORTNESS OF BREATH: 0
RHINORRHEA: 0
ABDOMINAL PAIN: 0
CONSTIPATION: 0
BLOOD IN STOOL: 0
NAUSEA: 0
VOMITING: 0
SORE THROAT: 1
WHEEZING: 0

## 2019-04-11 ASSESSMENT — PAIN DESCRIPTION - ORIENTATION: ORIENTATION: RIGHT;LEFT

## 2019-04-11 ASSESSMENT — PAIN SCALES - GENERAL: PAINLEVEL_OUTOF10: 3

## 2019-04-11 ASSESSMENT — PAIN DESCRIPTION - LOCATION: LOCATION: THROAT

## 2019-04-11 NOTE — ED PROVIDER NOTES
[facility]  eMERGENCY dEPARTMENT eNCOUnter          [address] [facility]       Chief Complaint   Patient presents with    Rash    Oral Swelling     throat       Nurses Notes reviewed and I agree except as notedin the HPI. HISTORY OF PRESENT ILLNESS    Connor Bates is a 26 y.o. female with a past medical history of GERD and headache. Patient presents to the ED for evaluation of a rash. She reports developed rash to her anterior neck two days ago which has been persistent since onset. The rash is itchy. She denies any new medication, soap, detergent, perfume, lotion, or make up exposure. She has been taking Benadryl for her symptoms. She does have a sore throat associated with the rash and feels like her throat is slightly swollen. No additional complaints or concerns at the time of initial evaluation. REVIEW OF SYSTEMS     Review of Systems   Constitutional: Negative for chills, diaphoresis and fever. HENT: Positive for sore throat. Negative for congestion and rhinorrhea. Respiratory: Negative for cough, shortness of breath and wheezing. Cardiovascular: Negative for chest pain, palpitations and leg swelling. Gastrointestinal: Negative for abdominal pain, blood in stool, constipation, diarrhea, nausea and vomiting. Musculoskeletal: Negative for arthralgias, myalgias and neck pain. Skin: Positive for rash. Neurological: Negative for dizziness, weakness, light-headedness, numbness and headaches. PAST MEDICAL HISTORY    has a past medical history of Camptodactyly, Fatigue, GERD (gastroesophageal reflux disease), Headache(784.0), Hormone disorder, and Septal defect. SURGICAL HISTORY      has a past surgical history that includes Knee arthroscopy (2011); Shoulder arthroscopy (Right); Tonsillectomy and adenoidectomy; Finger surgery (Left, 2004); Finger surgery; [place] tooth extraction (01/2018); pr lap,cholecystectomy (N/A, 3/8/2018);  Cholecystectomy (2018); and pr office/outpt visit,procedure only (N/A, 11/16/2018). CURRENT MEDICATIONS     Discharge Medication List as of 4/11/2019 11:39 AM      CONTINUE these medications which have NOT CHANGED    Details   Armodafinil (NUVIGIL) 150 MG TABS tablet Take 1 tablet by mouth daily for 60 days. , Disp-30 tablet, R-1Normal      ibuprofen (ADVIL;MOTRIN) 600 MG tablet Take 1 tablet by mouth every 6 hours as needed for Pain, Disp-60 tablet, R-1Normal      albuterol (PROVENTIL) (5 MG/ML) 0.5% nebulizer solution Take 1 mL by nebulization 4 times daily as needed for Wheezing, Disp-120 each, R-3Normal      ranitidine (ZANTAC) 150 MG tablet Take 150 mg by mouth daily as needed for HeartburnHistorical Med      calcium carbonate (TUMS) 500 MG chewable tablet Take 1 tablet by mouth After every mealHistorical Med      Multiple Vitamins-Minerals (THERAPEUTIC MULTIVITAMIN-MINERALS) tablet Take 1 tablet by mouth dailyHistorical Med      NONFORMULARY Birth controlHistorical Med      FLUoxetine (PROZAC) 20 MG capsule Take 20 mg by mouth dailyHistorical Med      lansoprazole (PREVACID) 30 MG capsule Take 30 mg by mouth daily. ALLERGIES     is allergic to latex; chloraprep one step [chlorhexidine gluconate]; and seasonal.    FAMILY HISTORY     indicated that her mother is alive. She indicated that her father is alive. She indicated that the status of her maternal grandmother is unknown. She indicated that the status of her maternal grandfather is unknown. She indicated that the status of her paternal grandmother is unknown. She indicated that the status of her other is unknown.   family history includes Cancer in her maternal grandfather; Depression in her maternal grandmother; Diabetes in her maternal grandmother; High Blood Pressure in her paternal grandmother; Migraines in her paternal grandmother; Stroke in an other family member. SOCIAL HISTORY      reports that she has never smoked.  She has never used smokeless tobacco. She

## 2019-04-11 NOTE — ED NOTES
Charge nurse, PA, primary nurse, mother and patient discussed concerns about not caring properly for the patient. Mother stated that we did not look in her throat to check for strep or airway issues. PA spoke about how he did look into her throat and asked if she had any other concerns at the time of the initial assessment. Pt. acknowledged that he did assess her throat but was upset about the bedside manner. Nurse made sure the patient was comfortable and addressed any concerns mother and patient were addressed.       Kirti Del Angel RN  04/11/19 2001

## 2019-04-11 NOTE — ED NOTES
Pt. States she is very upset with Rui SOTOMAYOR for they way he treated her duri ng is assessment. Pt. States he took a phone call during his assessment and was very rude. Pt. Was crying. Mother at bedside stating we did not properly assess the patient and her symptoms.       Essence Alvarez RN  04/11/19 [id number]

## 2019-04-11 NOTE — ED NOTES
Shayy Kelley [place] farrukh speak with patient about care and to make sure patient and family are aware of what is being done for the patient. PA very concerned that the patient will go home feeling like we did not address all concerns to properly treat the patient. PA asked if there were any specific treatments they would like to be done. Pt. States \"I just want this conversation to end. I have had a really bad week and just want to lay here and wait for my tests to come back. \" Nurse addressed concerns to make sure all needs were met before leaving the room.       Emi Rosa, RN  04/11/19 [id number]

## 2019-04-13 LAB — THROAT/NOSE CULTURE: NORMAL

## 2019-05-01 ENCOUNTER — TELEPHONE (OUTPATIENT)
Dept: PULMONOLOGY | Age: 24
End: 2019-05-01

## 2019-05-02 ENCOUNTER — TELEPHONE (OUTPATIENT)
Dept: SURGERY | Age: 24
End: 2019-05-02

## 2019-05-02 NOTE — PROGRESS NOTES
[facility]  New Patient Evaluation in Office  Pt Name: Abdirahman Womack  Date of Birth 1995   Today's Date: 5/3/2019  Medical Record Number: [id number]  Referring Provider: No ref. provider found  Primary Care Provider: BRAN Webb  Chief Complaint   Patient presents with   [facility] Surgical Consult     est patient-pilonidal abscess     ASSESSMENT       Diagnosis Orders   1. Pilonidal cyst with abscess       Past Medical History:   Diagnosis Date    Camptodactyly     Fatigue     GERD (gastroesophageal reflux disease)     Headache(784.0)     Hormone disorder     over active prolactin hormone    Idiopathic hypersomnia     Septal defect     heart defect          PLANS      1. I&D performed in the office today  2. Local wound care  3. Rx Augmentin  4. F/U in 7-10 days     Parminder Petersen is a 26 y.o. female seen in the consultation for evaluation of a pilonidal cyst. Lesion is located in the midline gluteal fold. It began 3 weeks ago and has gradually worsened. She has had one flare ups since it first began. Symptoms include redness, swelling and painShe has associated symptoms of none. She does not have previous history of cutaneous abscesses. She does not have diabetes, immunosuppression or history of MRSA. Therapy thus far has included oral antibiotics which has not been effective. Pain is controlled without any medications. .  Past Medical History  Past Medical History:   Diagnosis Date    Camptodactyly     Fatigue     GERD (gastroesophageal reflux disease)     Headache(784.0)     Hormone disorder     over active prolactin hormone    Idiopathic hypersomnia     Septal defect     heart defect     Past Surgical History  Past Surgical History:   Procedure Laterality Date    CHOLECYSTECTOMY  2018    collapsed lung during extubation.  Hosp X 2 days after   [facility] FINGER SURGERY Left 2004    5th digit straightening due to camptodactyly - [facility] FINGER SURGERY      6 finger straightenings with Dr. Darell Cortez @ [facility] ARTHROSCOPY  2011    [facility] - Dr. August Garcia    [place] LAP,CHOLECYSTECTOMY N/A 3/8/2018    CHOLECYSTECTOMY LAPAROSCOPIC, POSS OPEN performed by Cyrus Rees DO at [address] [place] [address] OFFICE/OUTPT VISIT,PROCEDURE ONLY N/A 11/16/2018    DILATATION AND CURETTAGE HYSTEROSCOPY, DIAGNOSTIC LAPAROSCOPY, lysis of adhesions, and removal of omentum performed by Leticia Bucio DO at [facility] ARTHROSCOPY Right     TONSILLECTOMY AND ADENOIDECTOMY      WISDOM TOOTH EXTRACTION  01/2018     Medications  Current Outpatient Medications   Medication Sig Dispense Refill    amoxicillin-clavulanate (AUGMENTIN) 500-125 MG per tablet Take 1 tablet by mouth 2 times daily for 10 days 20 tablet 0    Multiple Vitamins-Minerals (THERAPEUTIC MULTIVITAMIN-MINERALS) tablet Take 1 tablet by mouth daily      FLUoxetine (PROZAC) 20 MG capsule Take 20 mg by mouth daily      lansoprazole (PREVACID) 30 MG capsule Take 30 mg by mouth daily.  albuterol (PROVENTIL) (5 MG/ML) 0.5% nebulizer solution Take 1 mL by nebulization 4 times daily as needed for Wheezing 120 each 3    calcium carbonate (TUMS) 500 MG chewable tablet Take 1 tablet by mouth After every meal       No current facility-administered medications for this visit. Allergies  is allergic to latex; chloraprep one step [chlorhexidine gluconate]; and seasonal.  Family History  family history includes Cancer in her maternal grandfather; Depression in her maternal grandmother; Diabetes in her maternal grandmother; High Blood Pressure in her paternal grandmother; Migraines in her paternal grandmother; Stroke in an other family member. Social History   reports that she has never smoked. She has never used smokeless tobacco. She reports that she does not drink alcohol or use drugs.   Health Screening Exams  Health Maintenance   Topic Date Due    Varicella Vaccine (1 of 2 - 13+ 2-dose series) Positive for sleep disturbance. Negative for agitation, behavioral problems, confusion, decreased concentration, dysphoric mood, hallucinations, self-injury and suicidal ideas. The patient is not nervous/anxious and is not hyperactive. OBJECTIVE    VITALS:  height is 5' 6\" (1.676 m) and weight is 157 lb (71.2 kg). Her tympanic temperature is 97.7 °F (36.5 °C). Her blood pressure is 94/68 and her pulse is 97. Her respiration is 18 and oxygen saturation is 99%. Pain Score:   9 Body mass index is 25.34 kg/m². CONSTITUTIONAL: Alert and oriented times 3, no acute distress and cooperative to examination with proper mood and affect. SKIN: Skin color, texture, turgor normal. No rashes or lesions. LYMPH: no cervical nodes, no inguinal nodes  HEENT: Head is normocephalic, atraumatic. EOMI, PERRLA. NECK: Supple, symmetrical, trachea midline, no adenopathy, thyroid symmetric, not enlarged and no tenderness, skin normal.  CHEST/LUNGS: chest symmetric with normal A/P diameter, normal respiratory rate and rhythm, lungs clear to auscultation without wheezes, rales or rhonchi. No accessory muscle use. Scars None   CARDIOVASCULAR: Heart sounds are normal.  Regular rate and rhythm without murmur, gallop or rub. Normal S1 and S2. .  Carotid and femoral pulses 2+/4 and equal bilaterally. ABDOMEN: Normal shape. Laparoscopic scar(s) present. Normal bowel sounds. No bruits. soft, nontender, nondistended, no masses or organomegaly. no evidence of hernia. Percussion: Normal without hepatosplenomegally. RECTAL: pilonidal abscess present, no drainage, very tender. NEUROLOGIC: There are no focalizing motor or sensory deficits. CN II-XII are grossly intact. [facility] EXTREMITIES: no cyanosis, no clubbing and no edema.           OFFICE PROCEDURE NOTE    Indication: pilonidal abscess   Consent: The risks complications and benefits of the procedure where discussed with the patient including but not limited to bleeding, infection, open wound and recurrence. The patient was given an opportunity to ask questions. Once answered, the patient agreed to undergo the procedure. Procedure: The patient was placed in the appropriate position and the skin was prepped and draped in sterile fashion. The overlying skin infiltrated with local anesthetic. Using an #11 scalpel blade, the wound was opened, 5 cc of pustulent fluid was removed. This did not appear to be extended to the fascia level. The wound was copiously irrigated and sterile dressings were applied. The patient tolerated the procedure well. Complications: None  Thank you for the interesting evaluation. Further recommendations as listed above.        Electronically signed by Dorene Live DO on 5/3/2019 at 1:47 PM

## 2019-05-03 ENCOUNTER — OFFICE VISIT (OUTPATIENT)
Dept: SURGERY | Age: 24
End: 2019-05-03
Payer: COMMERCIAL

## 2019-05-03 VITALS
SYSTOLIC BLOOD PRESSURE: 94 MMHG | OXYGEN SATURATION: 99 % | HEART RATE: 97 BPM | WEIGHT: 157 LBS | HEIGHT: 66 IN | BODY MASS INDEX: 25.23 KG/M2 | RESPIRATION RATE: 18 BRPM | TEMPERATURE: 97.7 F | DIASTOLIC BLOOD PRESSURE: 68 MMHG

## 2019-05-03 DIAGNOSIS — L05.01 PILONIDAL CYST WITH ABSCESS: Primary | ICD-10-CM

## 2019-05-03 PROCEDURE — 10080 I&D PILONIDAL CYST SIMPLE: CPT | Performed by: SURGERY

## 2019-05-03 RX ORDER — AMOXICILLIN AND CLAVULANATE POTASSIUM 500; 125 MG/1; MG/1
1 TABLET, FILM COATED ORAL 2 TIMES DAILY
Qty: 20 TABLET | Refills: 0 | Status: SHIPPED | OUTPATIENT
Start: 2019-05-03 | End: 2019-05-13

## 2019-05-03 ASSESSMENT — ENCOUNTER SYMPTOMS
RHINORRHEA: 0
ABDOMINAL PAIN: 0
EYE REDNESS: 0
TROUBLE SWALLOWING: 0
SORE THROAT: 0
PHOTOPHOBIA: 0
VOMITING: 0
BACK PAIN: 0
STRIDOR: 0
RECTAL PAIN: 0
NAUSEA: 0
SINUS PAIN: 0
DIARRHEA: 0
COUGH: 0
FACIAL SWELLING: 0
EYE ITCHING: 0
EYE DISCHARGE: 0
ROS SKIN COMMENTS: PILONIDAL ABSCESS
VOICE CHANGE: 0
CONSTIPATION: 0
WHEEZING: 0
COLOR CHANGE: 0
SINUS PRESSURE: 0
ABDOMINAL DISTENTION: 0
BLOOD IN STOOL: 0
APNEA: 0
CHOKING: 0
SHORTNESS OF BREATH: 0
CHEST TIGHTNESS: 0
ANAL BLEEDING: 0
EYE PAIN: 0

## 2019-05-03 NOTE — LETTER
[address] [facility]  Rafael Lopez. [address]  Phone- [phone]  Fax [phone]    Pt Name: Alysa Pablo[address] Record Number: [id number]  Date of Birth 1995   Today's Date: 5/3/2019    Ron Purvis was evaluated in the office today. My assessment and plans are listed below. ASSESSMENT         Diagnosis Orders   1. Pilonidal cyst with abscess       Past Medical History:   Diagnosis Date    Camptodactyly     Fatigue     GERD (gastroesophageal reflux disease)     Headache(784.0)     Hormone disorder     over active prolactin hormone    Idiopathic hypersomnia     Septal defect     heart defect         PLANS:      1. I&D performed in the office today  2. Local wound care  3. Rx Augmentin  4. F/U in 7-10 days    If I can provide any additional assistance or you have any concerns, please feel free to contact me. Thank you for allowing to participate in the care of your patients. Sincerely,      Rafael Lopez.  Mandeep Nelson

## 2019-05-03 NOTE — PROGRESS NOTES
Subjective:      Patient ID: Jonh Cole is a 26 y.o. female. Chief Complaint   Patient presents with    Surgical Consult     est patient-pilonidal abscess       HPI    Review of Systems   Constitutional: Positive for activity change, diaphoresis and fever. Negative for appetite change, chills, fatigue and unexpected weight change. HENT: Negative for congestion, dental problem, drooling, ear discharge, ear pain, facial swelling, hearing loss, mouth sores, nosebleeds, postnasal drip, rhinorrhea, sinus pressure, sinus pain, sneezing, sore throat, tinnitus, trouble swallowing and voice change. Eyes: Negative for photophobia, pain, discharge, redness, itching and visual disturbance. Respiratory: Negative for apnea, cough, choking, chest tightness, shortness of breath, wheezing and stridor. Cardiovascular: Negative for chest pain, palpitations and leg swelling. Gastrointestinal: Negative for abdominal distention, abdominal pain, anal bleeding, blood in stool, constipation, diarrhea, nausea, rectal pain and vomiting. Genitourinary: Negative for decreased urine volume, difficulty urinating, dyspareunia, dysuria, enuresis, flank pain, frequency, genital sores, hematuria, menstrual problem, pelvic pain, urgency, vaginal bleeding, vaginal discharge and vaginal pain. Musculoskeletal: Negative for arthralgias, back pain, gait problem, joint swelling, myalgias, neck pain and neck stiffness. Skin: Positive for wound. Negative for color change, pallor and rash. Pilonidal abscess   Neurological: Negative for dizziness, tremors, seizures, syncope, facial asymmetry, speech difficulty, weakness, light-headedness, numbness and headaches. Hematological: Negative for adenopathy. Does not bruise/bleed easily. Psychiatric/Behavioral: Positive for sleep disturbance.  Negative for agitation, behavioral problems, confusion, decreased concentration, dysphoric mood, hallucinations, self-injury and suicidal ideas. The patient is not nervous/anxious and is not hyperactive.       BP 94/68 (Site: Right Upper Arm, Position: Standing, Cuff Size: Medium Adult)   Pulse 97   Temp 97.7 °F (36.5 °C) (Tympanic)   Resp 18   Ht 5' 6\" (1.676 m)   Wt 157 lb (71.2 kg)   LMP 03/29/2019   SpO2 99%   BMI 25.34 kg/m²     Objective:   Physical Exam    Assessment:            Plan:              Emilie Pino RN

## 2019-05-13 PROBLEM — R09.02 HYPOXIA: Status: RESOLVED | Noted: 2018-03-08 | Resolved: 2019-05-13

## 2019-05-13 NOTE — PROGRESS NOTES
Kwaku Lopez. [facility] Surgery  Established Patient Evaluation in Office  Pt Name: Bunny Morgan  Date of Birth 1995   Today's Date: 5/14/2019  Medical Record Number: [id number]  Referring Provider: No ref. provider found  Primary Care Provider: BRAN Vasquez  Chief Complaint   Patient presents with    Follow Up After Procedure     I & D Pilonidal cyst 5/3/19     ASSESSMENT       Diagnosis Orders   1. Pilonidal cyst with abscess      S/p office I&D 5/3/19     Past Medical History:   Diagnosis Date    Camptodactyly     Fatigue     GERD (gastroesophageal reflux disease)     Headache(784.0)     Hormone disorder     over active prolactin hormone    Idiopathic hypersomnia     Septal defect     heart defect          PLANS      Schedule [facility] for pilonidal cystectomy   Potential diagnostic and therapeutic options as well as alternatives were discussed with the patient. The potential for recurrence, infection, bleeding and an open wound were discussed. Specific reinforcement about appropriate wound care was discussed. The patient was given an opportunity to ask questions and has agreed to proceed with the procedure. Status: Outpatient  Planned anesthesia: general  She will undergo pre-operative clearance per anesthesia guidelines with risk factors listed under the past medical history diagnosis & problem list.     Alli Leone is a 26 y.o. female seen was in the consultation for evaluation of a pilonidal cyst. Lesion is located in the midline gluteal fold. It began 3 weeks ago and has gradually worsened. She has had one flare ups since it first began. Symptoms include redness, swelling and painShe has associated symptoms of none. She does not have previous history of cutaneous abscesses. She does not have diabetes, immunosuppression or history of MRSA. Pain is controlled without any medications. Office I&D performed on 5/3/19.  Since then, she has had improved pain, drainage has stopped. Past Medical History  Past Medical History:   Diagnosis Date    Camptodactyly     Fatigue     GERD (gastroesophageal reflux disease)     Headache(784.0)     Hormone disorder     over active prolactin hormone    Idiopathic hypersomnia     Septal defect     heart defect     Past Surgical History  Past Surgical History:   Procedure Laterality Date    CHOLECYSTECTOMY  2018    collapsed lung during extubation. Hosp X 2 days after   [facility] Eug[facility] FINGER SURGERY Left 2004    5th digit straightening due to camptodactyly - , [facility]    FINGER SURGERY      6 finger straightenings with Dr. Tal Mckeon @ [facility]  ARTHROSCOPY  2011    [facility] - Dr. Dory Delgadillo    CT LAP,CHOLECYSTECTOMY N/A 3/8/2018    CHOLECYSTECTOMY LAPAROSCOPIC, POSS OPEN performed by Sravani Polanco DO at [address] OFFICE/OUTPT VISIT,PROCEDURE ONLY N/A 11/16/2018    DILATATION AND CURETTAGE HYSTEROSCOPY, DIAGNOSTIC LAPAROSCOPY, lysis of adhesions, and removal of omentum performed by Michele Lawson DO at [facility] ARTHROSCOPY Right     TONSILLECTOMY AND ADENOIDECTOMY      WISDOM TOOTH EXTRACTION  01/2018     Medications  Current Outpatient Medications   Medication Sig Dispense Refill    albuterol (PROVENTIL) (5 MG/ML) 0.5% nebulizer solution Take 1 mL by nebulization 4 times daily as needed for Wheezing 120 each 3    calcium carbonate (TUMS) 500 MG chewable tablet Take 1 tablet by mouth After every meal      Multiple Vitamins-Minerals (THERAPEUTIC MULTIVITAMIN-MINERALS) tablet Take 1 tablet by mouth daily      FLUoxetine (PROZAC) 20 MG capsule Take 20 mg by mouth daily      lansoprazole (PREVACID) 30 MG capsule Take 30 mg by mouth daily. No current facility-administered medications for this visit.       Allergies  is allergic to latex; chloraprep one step [chlorhexidine gluconate]; and seasonal.  Family History  family history includes Cancer in her maternal grandfather; Depression in her maternal grandmother; Diabetes in her maternal grandmother; High Blood Pressure in her paternal grandmother; Migraines in her paternal grandmother; Stroke in an other family member. Social History   reports that she has never smoked. She has never used smokeless tobacco. She reports that she does not drink alcohol or use drugs. Health Screening Exams  Health Maintenance   Topic Date Due    Varicella Vaccine (1 of 2 - 13+ 2-dose series) 06/03/2010    HPV vaccine (1 - Female 3-dose series) 06/03/2012    HIV screen  06/03/2012    DTaP/Tdap/Td vaccine (1 - Tdap) 06/03/2016    Cervical cancer screen  06/03/2018    Chlamydia screen  07/31/2019    Flu vaccine (Season Ended) 09/01/2019    Meningococcal (ACWY) Vaccine  Aged Out    Pneumococcal 0-64 years Vaccine  Aged Out     Review of Systems  Constitutional: Positive for activity change, diaphoresis and fever. Negative for appetite change, chills, fatigue and unexpected weight change. HENT: Negative for congestion, dental problem, drooling, ear discharge, ear pain, facial swelling, hearing loss, mouth sores, nosebleeds, postnasal drip, rhinorrhea, sinus pressure, sinus pain, sneezing, sore throat, tinnitus, trouble swallowing and voice change. Eyes: Negative for photophobia, pain, discharge, redness, itching and visual disturbance. Respiratory: Negative for apnea, cough, choking, chest tightness, shortness of breath, wheezing and stridor. Cardiovascular: Negative for chest pain, palpitations and leg swelling. Gastrointestinal: Negative for abdominal distention, abdominal pain, anal bleeding, blood in stool, constipation, diarrhea, nausea, rectal pain and vomiting. Genitourinary: Negative for decreased urine volume, difficulty urinating, dyspareunia, dysuria, enuresis, flank pain, frequency, genital sores, hematuria, menstrual problem, pelvic pain, urgency, vaginal bleeding, vaginal discharge and vaginal pain.    Musculoskeletal: Negative for arthralgias, back pain, gait problem, joint swelling, myalgias, neck pain and neck stiffness. Skin: Positive for wound. Negative for color change, pallor and rash. Pilonidal abscess   Neurological: Negative for dizziness, tremors, seizures, syncope, facial asymmetry, speech difficulty, weakness, light-headedness, numbness and headaches. Hematological: Negative for adenopathy. Does not bruise/bleed easily. Psychiatric/Behavioral: Positive for sleep disturbance. Negative for agitation, behavioral problems, confusion, decreased concentration, dysphoric mood, hallucinations, self-injury and suicidal ideas. The patient is not nervous/anxious and is not hyperactive. OBJECTIVE    VITALS:  height is 5' 6\" (1.676 m) and weight is 156 lb (70.8 kg). Her tympanic temperature is 97.2 °F (36.2 °C). Her blood pressure is 102/64 and her pulse is 94. Her respiration is 16 and oxygen saturation is 99%. Pain Score:   0 - No pain Body mass index is 25.18 kg/m². CONSTITUTIONAL: Alert and oriented times 3, no acute distress and cooperative to examination with proper mood and affect. SKIN: Skin color, texture, turgor normal. No rashes or lesions. LYMPH: no cervical nodes, no inguinal nodes  HEENT: Head is normocephalic, atraumatic. EOMI, PERRLA. NECK: Supple, symmetrical, trachea midline, no adenopathy, thyroid symmetric, not enlarged and no tenderness, skin normal.  CHEST/LUNGS: chest symmetric with normal A/P diameter, normal respiratory rate and rhythm, lungs clear to auscultation without wheezes, rales or rhonchi. No accessory muscle use. Scars None   CARDIOVASCULAR: Heart sounds are normal.  Regular rate and rhythm without murmur, gallop or rub. Normal S1 and S2. .  Carotid and femoral pulses 2+/4 and equal bilaterally. ABDOMEN: Normal shape. Laparoscopic scar(s) present. Normal bowel sounds. No bruits. soft, nontender, nondistended, no masses or organomegaly. no evidence of hernia.  Percussion: Normal without hepatosplenomegally. RECTAL: pilonidal cyst present, no drainage, less tender. NEUROLOGIC: There are no focalizing motor or sensory deficits. CN II-XII are grossly intact. Hershell Broach EXTREMITIES: no cyanosis, no clubbing and no edema.               Electronically signed by Letty Patel DO on 5/14/2019 at 9:57 AM

## 2019-05-14 ENCOUNTER — OFFICE VISIT (OUTPATIENT)
Dept: SURGERY | Age: 24
End: 2019-05-14
Payer: COMMERCIAL

## 2019-05-14 VITALS
HEART RATE: 94 BPM | HEIGHT: 66 IN | RESPIRATION RATE: 16 BRPM | TEMPERATURE: 97.2 F | SYSTOLIC BLOOD PRESSURE: 102 MMHG | OXYGEN SATURATION: 99 % | WEIGHT: 156 LBS | BODY MASS INDEX: 25.07 KG/M2 | DIASTOLIC BLOOD PRESSURE: 64 MMHG

## 2019-05-14 DIAGNOSIS — L05.01 PILONIDAL CYST WITH ABSCESS: Primary | ICD-10-CM

## 2019-05-14 PROCEDURE — 99213 OFFICE O/P EST LOW 20 MIN: CPT | Performed by: SURGERY

## 2019-05-14 PROCEDURE — 1036F TOBACCO NON-USER: CPT | Performed by: SURGERY

## 2019-05-14 PROCEDURE — G8419 CALC BMI OUT NRM PARAM NOF/U: HCPCS | Performed by: SURGERY

## 2019-05-14 PROCEDURE — G8427 DOCREV CUR MEDS BY ELIG CLIN: HCPCS | Performed by: SURGERY

## 2019-05-30 ENCOUNTER — ANESTHESIA (OUTPATIENT)
Dept: OPERATING ROOM | Age: 24
End: 2019-05-30
Payer: COMMERCIAL

## 2019-05-30 ENCOUNTER — ANESTHESIA EVENT (OUTPATIENT)
Dept: OPERATING ROOM | Age: 24
End: 2019-05-30
Payer: COMMERCIAL

## 2019-05-30 ENCOUNTER — HOSPITAL ENCOUNTER (OUTPATIENT)
Age: 24
Setting detail: OUTPATIENT SURGERY
Discharge: HOME | End: 2019-05-30
Attending: SURGERY | Admitting: SURGERY
Payer: COMMERCIAL

## 2019-05-30 VITALS
HEIGHT: 66 IN | HEART RATE: 79 BPM | TEMPERATURE: 97.7 F | SYSTOLIC BLOOD PRESSURE: 96 MMHG | WEIGHT: 155.6 LBS | RESPIRATION RATE: 16 BRPM | BODY MASS INDEX: 25.01 KG/M2 | OXYGEN SATURATION: 97 % | DIASTOLIC BLOOD PRESSURE: 58 MMHG

## 2019-05-30 VITALS
RESPIRATION RATE: 5 BRPM | TEMPERATURE: 98.6 F | OXYGEN SATURATION: 100 % | DIASTOLIC BLOOD PRESSURE: 53 MMHG | SYSTOLIC BLOOD PRESSURE: 84 MMHG

## 2019-05-30 DIAGNOSIS — G89.18 ACUTE POSTOPERATIVE PAIN: Primary | ICD-10-CM

## 2019-05-30 LAB — PREGNANCY, URINE: NEGATIVE

## 2019-05-30 PROCEDURE — 7100000000 HC PACU RECOVERY - FIRST 15 MIN: Performed by: SURGERY

## 2019-05-30 PROCEDURE — 6360000002 HC RX W HCPCS: Performed by: NURSE ANESTHETIST, CERTIFIED REGISTERED

## 2019-05-30 PROCEDURE — 6370000000 HC RX 637 (ALT 250 FOR IP): Performed by: SURGERY

## 2019-05-30 PROCEDURE — 88304 TISSUE EXAM BY PATHOLOGIST: CPT

## 2019-05-30 PROCEDURE — 11770 EXC PILONIDAL CYST SIMPLE: CPT | Performed by: SURGERY

## 2019-05-30 PROCEDURE — 2709999900 HC NON-CHARGEABLE SUPPLY: Performed by: SURGERY

## 2019-05-30 PROCEDURE — 2500000003 HC RX 250 WO HCPCS: Performed by: NURSE ANESTHETIST, CERTIFIED REGISTERED

## 2019-05-30 PROCEDURE — 7100000010 HC PHASE II RECOVERY - FIRST 15 MIN: Performed by: SURGERY

## 2019-05-30 PROCEDURE — 2580000003 HC RX 258: Performed by: NURSE ANESTHETIST, CERTIFIED REGISTERED

## 2019-05-30 PROCEDURE — 81025 URINE PREGNANCY TEST: CPT

## 2019-05-30 PROCEDURE — 3700000000 HC ANESTHESIA ATTENDED CARE: Performed by: SURGERY

## 2019-05-30 PROCEDURE — 7100000001 HC PACU RECOVERY - ADDTL 15 MIN: Performed by: SURGERY

## 2019-05-30 PROCEDURE — 3600000012 HC SURGERY LEVEL 2 ADDTL 15MIN: Performed by: SURGERY

## 2019-05-30 PROCEDURE — 6360000002 HC RX W HCPCS: Performed by: SURGERY

## 2019-05-30 PROCEDURE — 3700000001 HC ADD 15 MINUTES (ANESTHESIA): Performed by: SURGERY

## 2019-05-30 PROCEDURE — 2500000003 HC RX 250 WO HCPCS: Performed by: SURGERY

## 2019-05-30 PROCEDURE — 7100000011 HC PHASE II RECOVERY - ADDTL 15 MIN: Performed by: SURGERY

## 2019-05-30 PROCEDURE — 3600000002 HC SURGERY LEVEL 2 BASE: Performed by: SURGERY

## 2019-05-30 RX ORDER — MIDAZOLAM HYDROCHLORIDE 1 MG/ML
INJECTION INTRAMUSCULAR; INTRAVENOUS PRN
Status: DISCONTINUED | OUTPATIENT
Start: 2019-05-30 | End: 2019-05-30 | Stop reason: SDUPTHER

## 2019-05-30 RX ORDER — ONDANSETRON 2 MG/ML
4 INJECTION INTRAMUSCULAR; INTRAVENOUS
Status: DISCONTINUED | OUTPATIENT
Start: 2019-05-30 | End: 2019-05-30 | Stop reason: HOSPADM

## 2019-05-30 RX ORDER — ONDANSETRON 2 MG/ML
4 INJECTION INTRAMUSCULAR; INTRAVENOUS EVERY 6 HOURS PRN
Status: DISCONTINUED | OUTPATIENT
Start: 2019-05-30 | End: 2019-05-30 | Stop reason: HOSPADM

## 2019-05-30 RX ORDER — SODIUM CHLORIDE 9 MG/ML
INJECTION, SOLUTION INTRAVENOUS CONTINUOUS
Status: DISCONTINUED | OUTPATIENT
Start: 2019-05-30 | End: 2019-05-30 | Stop reason: HOSPADM

## 2019-05-30 RX ORDER — DIPHENHYDRAMINE HYDROCHLORIDE 50 MG/ML
12.5 INJECTION INTRAMUSCULAR; INTRAVENOUS
Status: DISCONTINUED | OUTPATIENT
Start: 2019-05-30 | End: 2019-05-30 | Stop reason: HOSPADM

## 2019-05-30 RX ORDER — OXYCODONE HYDROCHLORIDE AND ACETAMINOPHEN 5; 325 MG/1; MG/1
1 TABLET ORAL EVERY 4 HOURS PRN
Status: DISCONTINUED | OUTPATIENT
Start: 2019-05-30 | End: 2019-05-30 | Stop reason: HOSPADM

## 2019-05-30 RX ORDER — MORPHINE SULFATE 2 MG/ML
2 INJECTION, SOLUTION INTRAMUSCULAR; INTRAVENOUS EVERY 5 MIN PRN
Status: DISCONTINUED | OUTPATIENT
Start: 2019-05-30 | End: 2019-05-30 | Stop reason: HOSPADM

## 2019-05-30 RX ORDER — HYDRALAZINE HYDROCHLORIDE 20 MG/ML
5 INJECTION INTRAMUSCULAR; INTRAVENOUS EVERY 10 MIN PRN
Status: DISCONTINUED | OUTPATIENT
Start: 2019-05-30 | End: 2019-05-30 | Stop reason: HOSPADM

## 2019-05-30 RX ORDER — SODIUM CHLORIDE 0.9 % (FLUSH) 0.9 %
10 SYRINGE (ML) INJECTION EVERY 12 HOURS SCHEDULED
Status: DISCONTINUED | OUTPATIENT
Start: 2019-05-30 | End: 2019-05-30 | Stop reason: HOSPADM

## 2019-05-30 RX ORDER — ROCURONIUM BROMIDE 10 MG/ML
INJECTION, SOLUTION INTRAVENOUS PRN
Status: DISCONTINUED | OUTPATIENT
Start: 2019-05-30 | End: 2019-05-30 | Stop reason: SDUPTHER

## 2019-05-30 RX ORDER — DEXAMETHASONE SODIUM PHOSPHATE 4 MG/ML
INJECTION, SOLUTION INTRA-ARTICULAR; INTRALESIONAL; INTRAMUSCULAR; INTRAVENOUS; SOFT TISSUE PRN
Status: DISCONTINUED | OUTPATIENT
Start: 2019-05-30 | End: 2019-05-30 | Stop reason: SDUPTHER

## 2019-05-30 RX ORDER — OXYCODONE HYDROCHLORIDE AND ACETAMINOPHEN 5; 325 MG/1; MG/1
1 TABLET ORAL EVERY 6 HOURS PRN
Qty: 28 TABLET | Refills: 0 | Status: SHIPPED | OUTPATIENT
Start: 2019-05-30 | End: 2019-06-06

## 2019-05-30 RX ORDER — MEPERIDINE HYDROCHLORIDE 25 MG/ML
12.5 INJECTION INTRAMUSCULAR; INTRAVENOUS; SUBCUTANEOUS EVERY 5 MIN PRN
Status: DISCONTINUED | OUTPATIENT
Start: 2019-05-30 | End: 2019-05-30 | Stop reason: HOSPADM

## 2019-05-30 RX ORDER — ONDANSETRON 2 MG/ML
INJECTION INTRAMUSCULAR; INTRAVENOUS PRN
Status: DISCONTINUED | OUTPATIENT
Start: 2019-05-30 | End: 2019-05-30 | Stop reason: SDUPTHER

## 2019-05-30 RX ORDER — NEOSTIGMINE METHYLSULFATE 5 MG/5 ML
SYRINGE (ML) INTRAVENOUS PRN
Status: DISCONTINUED | OUTPATIENT
Start: 2019-05-30 | End: 2019-05-30 | Stop reason: SDUPTHER

## 2019-05-30 RX ORDER — GLYCOPYRROLATE 1 MG/5 ML
SYRINGE (ML) INTRAVENOUS PRN
Status: DISCONTINUED | OUTPATIENT
Start: 2019-05-30 | End: 2019-05-30 | Stop reason: SDUPTHER

## 2019-05-30 RX ORDER — BUPIVACAINE HYDROCHLORIDE AND EPINEPHRINE 5; 5 MG/ML; UG/ML
INJECTION, SOLUTION EPIDURAL; INTRACAUDAL; PERINEURAL PRN
Status: DISCONTINUED | OUTPATIENT
Start: 2019-05-30 | End: 2019-05-30 | Stop reason: ALTCHOICE

## 2019-05-30 RX ORDER — LIDOCAINE HYDROCHLORIDE 20 MG/ML
INJECTION, SOLUTION EPIDURAL; INFILTRATION; INTRACAUDAL; PERINEURAL PRN
Status: DISCONTINUED | OUTPATIENT
Start: 2019-05-30 | End: 2019-05-30 | Stop reason: SDUPTHER

## 2019-05-30 RX ORDER — FENTANYL CITRATE 50 UG/ML
INJECTION, SOLUTION INTRAMUSCULAR; INTRAVENOUS PRN
Status: DISCONTINUED | OUTPATIENT
Start: 2019-05-30 | End: 2019-05-30 | Stop reason: SDUPTHER

## 2019-05-30 RX ORDER — SODIUM CHLORIDE 9 MG/ML
INJECTION, SOLUTION INTRAVENOUS CONTINUOUS PRN
Status: DISCONTINUED | OUTPATIENT
Start: 2019-05-30 | End: 2019-05-30 | Stop reason: SDUPTHER

## 2019-05-30 RX ORDER — SULFAMETHOXAZOLE AND TRIMETHOPRIM 800; 160 MG/1; MG/1
1 TABLET ORAL 2 TIMES DAILY
Qty: 20 TABLET | Refills: 0 | Status: SHIPPED | OUTPATIENT
Start: 2019-05-30 | End: 2019-06-09

## 2019-05-30 RX ORDER — LABETALOL HYDROCHLORIDE 5 MG/ML
5 INJECTION, SOLUTION INTRAVENOUS EVERY 5 MIN PRN
Status: DISCONTINUED | OUTPATIENT
Start: 2019-05-30 | End: 2019-05-30 | Stop reason: HOSPADM

## 2019-05-30 RX ORDER — PROPOFOL 10 MG/ML
INJECTION, EMULSION INTRAVENOUS PRN
Status: DISCONTINUED | OUTPATIENT
Start: 2019-05-30 | End: 2019-05-30 | Stop reason: SDUPTHER

## 2019-05-30 RX ORDER — SODIUM CHLORIDE 0.9 % (FLUSH) 0.9 %
10 SYRINGE (ML) INJECTION PRN
Status: DISCONTINUED | OUTPATIENT
Start: 2019-05-30 | End: 2019-05-30 | Stop reason: HOSPADM

## 2019-05-30 RX ORDER — FENTANYL CITRATE 50 UG/ML
50 INJECTION, SOLUTION INTRAMUSCULAR; INTRAVENOUS EVERY 5 MIN PRN
Status: DISCONTINUED | OUTPATIENT
Start: 2019-05-30 | End: 2019-05-30 | Stop reason: HOSPADM

## 2019-05-30 RX ADMIN — DEXAMETHASONE SODIUM PHOSPHATE 8 MG: 4 INJECTION, SOLUTION INTRAMUSCULAR; INTRAVENOUS at 13:10

## 2019-05-30 RX ADMIN — FENTANYL CITRATE 100 MCG: 50 INJECTION INTRAMUSCULAR; INTRAVENOUS at 13:08

## 2019-05-30 RX ADMIN — MIDAZOLAM HYDROCHLORIDE 2 MG: 1 INJECTION, SOLUTION INTRAMUSCULAR; INTRAVENOUS at 13:04

## 2019-05-30 RX ADMIN — Medication 0.6 MG: at 13:29

## 2019-05-30 RX ADMIN — OXYCODONE HYDROCHLORIDE AND ACETAMINOPHEN 1 TABLET: 5; 325 TABLET ORAL at 14:24

## 2019-05-30 RX ADMIN — PROPOFOL 200 MG: 10 INJECTION, EMULSION INTRAVENOUS at 13:08

## 2019-05-30 RX ADMIN — ONDANSETRON HYDROCHLORIDE 4 MG: 4 INJECTION, SOLUTION INTRAMUSCULAR; INTRAVENOUS at 13:28

## 2019-05-30 RX ADMIN — ROCURONIUM BROMIDE 40 MG: 10 INJECTION INTRAVENOUS at 13:08

## 2019-05-30 RX ADMIN — SODIUM CHLORIDE: 9 INJECTION, SOLUTION INTRAVENOUS at 13:03

## 2019-05-30 RX ADMIN — FENTANYL CITRATE 50 MCG: 50 INJECTION INTRAMUSCULAR; INTRAVENOUS at 13:28

## 2019-05-30 RX ADMIN — Medication 5 MG: at 13:29

## 2019-05-30 RX ADMIN — Medication 2 G: at 13:10

## 2019-05-30 RX ADMIN — LIDOCAINE HYDROCHLORIDE 40 MG: 20 INJECTION, SOLUTION EPIDURAL; INFILTRATION; INTRACAUDAL; PERINEURAL at 13:08

## 2019-05-30 ASSESSMENT — PULMONARY FUNCTION TESTS
PIF_VALUE: 12
PIF_VALUE: 15
PIF_VALUE: 2
PIF_VALUE: 15
PIF_VALUE: 1
PIF_VALUE: 15
PIF_VALUE: 24
PIF_VALUE: 1
PIF_VALUE: 3
PIF_VALUE: 1
PIF_VALUE: 3
PIF_VALUE: 14
PIF_VALUE: 1
PIF_VALUE: 2
PIF_VALUE: 14
PIF_VALUE: 14
PIF_VALUE: 1
PIF_VALUE: 16
PIF_VALUE: 17
PIF_VALUE: 1
PIF_VALUE: 2
PIF_VALUE: 15
PIF_VALUE: 16
PIF_VALUE: 1
PIF_VALUE: 16
PIF_VALUE: 15
PIF_VALUE: 15
PIF_VALUE: 14
PIF_VALUE: 13

## 2019-05-30 ASSESSMENT — PAIN - FUNCTIONAL ASSESSMENT: PAIN_FUNCTIONAL_ASSESSMENT: 0-10

## 2019-05-30 ASSESSMENT — PAIN SCALES - GENERAL: PAINLEVEL_OUTOF10: 6

## 2019-05-30 NOTE — ANESTHESIA POSTPROCEDURE EVALUATION
Department of Anesthesiology  Postprocedure Note    Patient: Jina Conteh  MRN: [id number]  YOB: 1995  Date of evaluation: 5/30/2019  Time:  2:17 PM     Procedure Summary     Date:  05/30/19 Room / Location:  [facility] OR 04 / [address]    Anesthesia Start:  1303 Anesthesia Stop:  1335    Procedure:  PILONIDAL CYSTECTOMY (N/A Buttocks) Diagnosis:  (PILONIDAL CYST)    Surgeon:  Jovi De Oliveira DO Responsible Provider:  Rosa Singleton MD    Anesthesia Type:  general ASA Status:  2          Anesthesia Type: general    Padmini Phase I: Padmini Score: 10    Padmini Phase II: Padmini Score: 10    Last vitals: Reviewed and per EMR flowsheets. Anesthesia Post Evaluation   [address]  POST-ANESTHESIA NOTE       Name:  Jina Conteh                                         Age:  26 y.o.   MRN:  [id number]      Last Vitals:  BP (!) 96/58   Pulse 79   Temp 97.7 °F (36.5 °C) (Temporal)   Resp 16   Ht 5' 6\" (1.676 m)   Wt 155 lb 9.6 oz (70.6 kg)   LMP 03/25/2019   SpO2 97%   BMI 25.11 kg/m²   Patient Vitals for the past 4 hrs:   BP Temp Temp src Pulse Resp SpO2 Height Weight   05/30/19 1405 (!) 96/58 -- -- 79 16 97 % -- --   05/30/19 1400 (!) 96/57 -- -- 92 12 96 % -- --   05/30/19 1355 (!) 102/57 -- -- 98 16 96 % -- --   05/30/19 1350 (!) 98/54 -- -- 102 20 96 % -- --   05/30/19 1345 (!) 104/52 -- -- 111 19 96 % -- --   05/30/19 1340 102/63 -- -- 122 21 94 % -- --   05/30/19 1335 -- -- -- 108 16 99 % -- --   05/30/19 1333 102/67 97.7 °F (36.5 °C) Temporal 105 18 99 % -- --   05/30/19 1241 126/73 97.3 °F (36.3 °C) Temporal 88 16 100 % 5' 6\" (1.676 m) 155 lb 9.6 oz (70.6 kg)       Level of Consciousness:  Awake    Respiratory:  Stable    Oxygen Saturation:  Stable    Cardiovascular:  Stable    Hydration:  Adequate    PONV:  Stable    Post-op Pain:  Adequate analgesia    Post-op Assessment:  No apparent anesthetic complications    Additional Follow-Up / Treatment / Comment: None    Goldie Pham MD  May 30, 2019   2:17 PM

## 2019-05-30 NOTE — ANESTHESIA PRE PROCEDURE
Department of Anesthesiology  Preprocedure Note       Name:  Judy Smith   Age:  26 y.o.  :  1995                                          MRN:  [id number]         Date:  2019      Surgeon: Lieutenant Pelletier):  Lizet Jeffrey,     Procedure: PILONIDAL CYSTECTOMY (N/A Buttocks)    Medications prior to admission:   Prior to Admission medications    Medication Sig Start Date End Date Taking? Authorizing Provider   calcium carbonate (TUMS) 500 MG chewable tablet Take 1 tablet by mouth After every meal   Yes Historical Provider, MD   FLUoxetine (PROZAC) 20 MG capsule Take 20 mg by mouth daily   Yes Historical Provider, MD   lansoprazole (PREVACID) 30 MG capsule Take 30 mg by mouth daily. Yes Historical Provider, MD   albuterol (PROVENTIL) (5 MG/ML) 0.5% nebulizer solution Take 1 mL by nebulization 4 times daily as needed for Wheezing 3/20/18   Lizet Wojciech, DO   Multiple Vitamins-Minerals (THERAPEUTIC MULTIVITAMIN-MINERALS) tablet Take 1 tablet by mouth daily    Historical Provider, MD       Current medications:    Current Facility-Administered Medications   Medication Dose Route Frequency Provider Last Rate Last Dose    0.9 % sodium chloride infusion   Intravenous Continuous Silvia Villanuevapiter Chanceser, DO        sodium chloride flush 0.9 % injection 10 mL  10 mL Intravenous 2 times per day Silvia Villanuevapiter Chanceser, DO        sodium chloride flush 0.9 % injection 10 mL  10 mL Intravenous PRN Silvia Villanuevapiter Chanceser, DO        ceFAZolin (ANCEF) 2 g in dextrose 5 % 50 mL IVPB  2 g Intravenous On Call to [facility],            Allergies: Allergies   Allergen Reactions    Latex Itching and Rash     Strong family history with need of epipen    Chloraprep One Step [Chlorhexidine Gluconate] Rash    Seasonal        Problem List:    Patient Active Problem List   Diagnosis Code    Concussion S06. 0X9A    Headache R51    Post concussion syndrome F07.81    Anxiety F41.9    Pelvic pain in female R10.2    Dysmenorrhea N94.6    Pelvic peritoneal adhesions, female N73.6    Idiopathic hypersomnia G47.11       Past Medical History:        Diagnosis Date    Camptodactyly     Fatigue     GERD (gastroesophageal reflux disease)     Headache(784.0)     Hormone disorder     over active prolactin hormone    Idiopathic hypersomnia     Septal defect     heart defect       Past Surgical History:        Procedure Laterality Date    CHOLECYSTECTOMY  2018    collapsed lung during extubation. Hosp X 2 days after    FINGER SURGERY Left 2004    [facility]    FINGER SURGERY      6 finger straightenings with Dr. Felecia Villagomez @ [facility] ARTHROSCOPY  2011    [facility] - Dr. Rodney Espinoza    [place] LAP,CHOLECYSTECTOMY N/A 3/8/2018    CHOLECYSTECTOMY LAPAROSCOPIC, POSS OPEN performed by Chu Cheema DO at [address] [place] [address] OFFICE/OUTPT [address] [place] [address] N/A 11/16/2018    DILATATION AND CURETTAGE HYSTEROSCOPY, DIAGNOSTIC LAPAROSCOPY, lysis of adhesions, and removal of omentum performed by Ashutosh Santos DO at [facility] ARTHROSCOPY Right     TONSILLECTOMY AND ADENOIDECTOMY      WISDOM TOOTH EXTRACTION  01/2018       Social History:    Social History     Tobacco Use    Smoking status: Never Smoker    Smokeless tobacco: Never Used   Substance Use Topics    Alcohol use:  No                                Counseling given: Not Answered      Vital Signs (Current):   Vitals:    05/30/19 1241   BP: 126/73   Pulse: 88   Resp: 16   Temp: 97.3 °F (36.3 °C)   TempSrc: Temporal   SpO2: 100%   Weight: 155 lb 9.6 oz (70.6 kg)   Height: 5' 6\" (1.676 m)                                              BP Readings from Last 3 Encounters:   05/30/19 126/73   05/14/19 102/64   05/03/19 94/68       NPO Status: Time of last liquid consumption: 2330                        Time of last solid consumption: 2330                        Date of last liquid consumption: 05/29/19

## 2019-05-30 NOTE — H&P
[facility]  [facility]  Established Patient Evaluation in Office  Pt Name: Noelle Garrido  Date of Birth 1995   Today's Date: 5/14/2019  Medical Record Number: [id number]  Referring Provider: No ref. provider found  Primary Care Provider: BRAN Rossi  Chief Complaint   Patient presents with    Follow Up After Procedure     I & D Pilonidal cyst 5/3/19     ASSESSMENT       Diagnosis Orders   1. Pilonidal cyst with abscess      S/p office I&D 5/3/19     Past Medical History:   Diagnosis Date    Camptodactyly     Fatigue     GERD (gastroesophageal reflux disease)     Headache(784.0)     Hormone disorder     over active prolactin hormone    Idiopathic hypersomnia     Septal defect     heart defect          PLANS      Schedule Jasmin Garcia for pilonidal cystectomy   Potential diagnostic and therapeutic options as well as alternatives were discussed with the patient. The potential for recurrence, infection, bleeding and an open wound were discussed. Specific reinforcement about appropriate wound care was discussed. The patient was given an opportunity to ask questions and has agreed to proceed with the procedure. Status: Outpatient  Planned anesthesia: general  She will undergo pre-operative clearance per anesthesia guidelines with risk factors listed under the past medical history diagnosis & problem list.     Navjot Ross is a 26 y.o. female seen was in the consultation for evaluation of a pilonidal cyst. Lesion is located in the midline gluteal fold. It began 3 weeks ago and has gradually worsened. She has had one flare ups since it first began. Symptoms include redness, swelling and painShe has associated symptoms of none. She does not have previous history of cutaneous abscesses. She does not have diabetes, immunosuppression or history of MRSA. Pain is controlled without any medications. Office I&D performed on 5/3/19.  Since then, she has had improved pain, drainage has stopped. Past Medical History  Past Medical History:   Diagnosis Date    Camptodactyly     Fatigue     GERD (gastroesophageal reflux disease)     Headache(784.0)     Hormone disorder     over active prolactin hormone    Idiopathic hypersomnia     Septal defect     heart defect     Past Surgical History  Past Surgical History:   Procedure Laterality Date    CHOLECYSTECTOMY  2018    collapsed lung during extubation. Hosp X 2 days after   [place] FINGER SURGERY Left 2004    5th digit straightening due to camptodactyly - , [facility]    FINGER SURGERY      6 finger straightenings with Dr. Wiley French @ [facility] ARTHROSCOPY  2011    [facility] - Dr. Ronald Ramos    [place] LAP,CHOLECYSTECTOMY N/A 3/8/2018    CHOLECYSTECTOMY LAPAROSCOPIC, POSS OPEN performed by Kolby Nagel DO at [address] OFFICE/OUTPT VISIT,PROCEDURE ONLY N/A 11/16/2018    DILATATION AND CURETTAGE HYSTEROSCOPY, DIAGNOSTIC LAPAROSCOPY, lysis of adhesions, and removal of omentum performed by Faith Shelton DO at [facility] ARTHROSCOPY Right     TONSILLECTOMY AND ADENOIDECTOMY      WISDOM TOOTH EXTRACTION  01/2018     Medications  Current Outpatient Medications   Medication Sig Dispense Refill    albuterol (PROVENTIL) (5 MG/ML) 0.5% nebulizer solution Take 1 mL by nebulization 4 times daily as needed for Wheezing 120 each 3    calcium carbonate (TUMS) 500 MG chewable tablet Take 1 tablet by mouth After every meal      Multiple Vitamins-Minerals (THERAPEUTIC MULTIVITAMIN-MINERALS) tablet Take 1 tablet by mouth daily      FLUoxetine (PROZAC) 20 MG capsule Take 20 mg by mouth daily      lansoprazole (PREVACID) 30 MG capsule Take 30 mg by mouth daily. No current facility-administered medications for this visit.       Allergies  is allergic to latex; chloraprep one step [chlorhexidine gluconate]; and seasonal.  Family History  family history includes Cancer in her maternal grandfather; Depression in her maternal grandmother; Diabetes in her maternal grandmother; High Blood Pressure in her paternal grandmother; Migraines in her paternal grandmother; Stroke in an other family member. Social History   reports that she has never smoked. She has never used smokeless tobacco. She reports that she does not drink alcohol or use drugs. Health Screening Exams  Health Maintenance   Topic Date Due    Varicella Vaccine (1 of 2 - 13+ 2-dose series) 06/03/2010    HPV vaccine (1 - Female 3-dose series) 06/03/2012    HIV screen  06/03/2012    DTaP/Tdap/Td vaccine (1 - Tdap) 06/03/2016    Cervical cancer screen  06/03/2018    Chlamydia screen  07/31/2019    Flu vaccine (Season Ended) 09/01/2019    Meningococcal (ACWY) Vaccine  Aged Out    Pneumococcal 0-64 years Vaccine  Aged Out     Review of Systems  Constitutional: Positive for activity change, diaphoresis and fever. Negative for appetite change, chills, fatigue and unexpected weight change. HENT: Negative for congestion, dental problem, drooling, ear discharge, ear pain, facial swelling, hearing loss, mouth sores, nosebleeds, postnasal drip, rhinorrhea, sinus pressure, sinus pain, sneezing, sore throat, tinnitus, trouble swallowing and voice change. Eyes: Negative for photophobia, pain, discharge, redness, itching and visual disturbance. Respiratory: Negative for apnea, cough, choking, chest tightness, shortness of breath, wheezing and stridor. Cardiovascular: Negative for chest pain, palpitations and leg swelling. Gastrointestinal: Negative for abdominal distention, abdominal pain, anal bleeding, blood in stool, constipation, diarrhea, nausea, rectal pain and vomiting. Genitourinary: Negative for decreased urine volume, difficulty urinating, dyspareunia, dysuria, enuresis, flank pain, frequency, genital sores, hematuria, menstrual problem, pelvic pain, urgency, vaginal bleeding, vaginal discharge and vaginal pain.    Musculoskeletal: Negative for without hepatosplenomegally. RECTAL: pilonidal cyst present, no drainage, less tender. NEUROLOGIC: There are no focalizing motor or sensory deficits. CN II-XII are grossly intact. Allayne Della EXTREMITIES: no cyanosis, no clubbing and no edema. Electronically signed by Irena Michelle DO on 5/14/2019 at 9:57 AM      DO BIJAN Hughes DR GENERAL SURGERY  History and Physical Update    Pt Name: Arcelia Hines  MRN: [id number]  YOB: 1995  Date of evaluation: 5/30/2019    I have examined the patient and reviewed the H&P/Consult and there are no changes to the patient or plans.          Electronically signed by Irena Michelle DO on 5/30/2019 at 12:58 PM

## 2019-05-31 ENCOUNTER — TELEPHONE (OUTPATIENT)
Dept: SURGERY | Age: 24
End: 2019-05-31

## 2019-05-31 NOTE — TELEPHONE ENCOUNTER
Called to check on patient post op. Patient stated is feeling pretty good, having a little pain . Denies any problems Urinating. Patient is up and moving around with no problems. Patient does not have any concerns at this time. Advised patient to call office with any questions or concerns.

## 2019-06-07 ENCOUNTER — OFFICE VISIT (OUTPATIENT)
Dept: SURGERY | Age: 24
End: 2019-06-07

## 2019-06-07 VITALS
WEIGHT: 155 LBS | DIASTOLIC BLOOD PRESSURE: 64 MMHG | HEIGHT: 66 IN | OXYGEN SATURATION: 99 % | RESPIRATION RATE: 18 BRPM | TEMPERATURE: 98 F | BODY MASS INDEX: 24.91 KG/M2 | HEART RATE: 90 BPM | SYSTOLIC BLOOD PRESSURE: 110 MMHG

## 2019-06-07 DIAGNOSIS — Z98.890 S/P SURGICAL REMOVAL OF PILONIDAL CYST: Primary | ICD-10-CM

## 2019-06-07 PROCEDURE — 99024 POSTOP FOLLOW-UP VISIT: CPT | Performed by: SURGERY

## 2019-06-07 NOTE — LETTER
Rossana Horton DO  [address] [address]  [phone]     Pt Name: Aletha Rendon  Medical Record Number: [id number]  Date of Birth 1995   Today's Date: 6/7/2019    Brad Govea was evaluated in the office today. My assessment and plans are listed below. ASSESSMENT         Diagnosis Orders   1. S/P surgical removal of pilonidal cyst      5/30/19   Wound is healing as expected with appropriate granulation tissue present\"  PLANS:      Pathology reviewed with the patient who understands. All questions were answered. Continue daily and prn dressing changes with AMD dressings  Follow up: Return in about 2 weeks (around 6/21/2019). If I can provide any additional assistance or you have any concerns, please feel free to contact me. Thank you for allowing to participate in the care of your patients. Sincerely,      Trenton Sotelo.  Mandeep Nelson

## 2019-06-07 NOTE — PROGRESS NOTES
amd antimicrobial spnge, albuterol, calcium carbonate, therapeutic multivitamin-minerals, fluoxetine, and lansoprazole. Allergies  is allergic to latex; chloraprep one step [chlorhexidine gluconate]; and seasonal.  Social History   reports that she has never smoked. She has never used smokeless tobacco. She reports that she does not drink alcohol or use drugs. Health Screening Exams  Health Maintenance   Topic Date Due    Varicella Vaccine (1 of 2 - 13+ 2-dose series) 06/03/2010    HPV vaccine (1 - Female 3-dose series) 06/03/2012    HIV screen  06/03/2012    DTaP/Tdap/Td vaccine (1 - Tdap) 06/03/2016    Cervical cancer screen  06/03/2018    Chlamydia screen  07/31/2019    Flu vaccine (Season Ended) 09/01/2019    Pneumococcal 0-64 years Vaccine  Aged Out     Review of Systems  History obtained from the patient. Constitutional: Denies any fever or chills. Wound: Denies rash, skin color change or wound problems. OBJECTIVE      VITALS:  height is 5' 6\" (1.676 m) and weight is 155 lb (70.3 kg). Her tympanic temperature is 98 °F (36.7 °C). Her blood pressure is 110/64 and her pulse is 90. Her respiration is 18 and oxygen saturation is 99%. Pain Score:   6  CONSTITUTIONAL: Alert and oriented times 3, no acute distress and cooperative to examination. SKIN: Skin color, texture, turgor normal. No rashes or lesions. INCISION: appears to be granulating well, no exudate, serous drainage. Thank you for the interesting evaluation. Further recommendations as listed above.      Electronically signed by Irena Michelle DO on 6/7/2019 at 9:43 AM

## 2019-06-17 ENCOUNTER — TELEPHONE (OUTPATIENT)
Dept: SURGERY | Age: 24
End: 2019-06-17

## 2019-06-18 NOTE — TELEPHONE ENCOUNTER
Clean it out daily with diluted peroxide at the time of dressing  changes, nothing stronger than 50/50 mixture with water.

## 2019-06-24 NOTE — PROGRESS NOTES
Aníbal Coronado. Omar, [address] [place] [address] Rd. [address]  [phone]  Post Procedure Evaluation in Office    Date of Birth 1995   Today's Date: 6/25/2019  Medical Record Number: [id number]  Referring Provider: No ref. provider found  Primary Care Provider: BRAN Conroy  Chief Complaint   Patient presents with    Post-Op Check     s/p Pilonidal cystectomy 5/30     ASSESSMENT       Diagnosis Orders   1. S/P surgical removal of pilonidal cyst      5/30/19   Wound is healing as expected with appropriate granulation tissue present\"   PLANS      Pathology reviewed with the patient who understands. All questions were answered. Continue daily and prn dressing changes with AMD dressings  Follow up: Return in about 2 weeks (around 7/9/2019). Shadia Laguerre is seen today for post-op follow-up. She is S/P pilonidal cystectomy 5/30/19. She is tolerating a regular diet, having regular bowel movements. Symptoms and activity have gradually improved compared to preoperative. She had some more significant drainage last week that improved with peroxide irrigation. Pain is controlled without any narcotic pain medications. She has compliant with postoperative instructions. Past Medical History   has a past medical history of Camptodactyly, Fatigue, GERD (gastroesophageal reflux disease), Headache(784.0), Hormone disorder, Idiopathic hypersomnia, and Septal defect. Past Surgical History   has a past surgical history that includes Knee arthroscopy (2011); Shoulder arthroscopy (Right); Tonsillectomy and adenoidectomy; Finger surgery (Left, 2004); Finger surgery; [place] tooth extraction (01/2018); pr lap,cholecystectomy (N/A, 3/8/2018); Cholecystectomy (2018); pr office/outpt visit,procedure only (N/A, 11/16/2018); and Pilonidal cyst excision (N/A, 5/30/2019).   Medications  has a current medication list which includes the following prescription(s): diphenhydramine, curity amd antimicrobial spnge, albuterol, calcium carbonate, therapeutic multivitamin-minerals, fluoxetine, and lansoprazole. Allergies  is allergic to latex; chloraprep one step [chlorhexidine gluconate]; and seasonal.  Social History   reports that she has never smoked. She has never used smokeless tobacco. She reports that she does not drink alcohol or use drugs. Health Screening Exams  Health Maintenance   Topic Date Due    Varicella Vaccine (1 of 2 - 13+ 2-dose series) 06/03/2010    HPV vaccine (1 - Female 3-dose series) 06/03/2012    HIV screen  06/03/2012    DTaP/Tdap/Td vaccine (1 - Tdap) 06/03/2016    Cervical cancer screen  06/03/2018    Chlamydia screen  07/31/2019    Flu vaccine (Season Ended) 09/01/2019    Pneumococcal 0-64 years Vaccine  Aged Out     Review of Systems  History obtained from the patient. Constitutional: Denies any fever or chills. Wound: Denies rash, skin color change or wound problems. OBJECTIVE      VITALS:  height is 5' 6\" (1.676 m) and weight is 156 lb (70.8 kg). Her tympanic temperature is 98.5 °F (36.9 °C). Her blood pressure is 110/60 and her pulse is 96. Her respiration is 18 and oxygen saturation is 98%. Pain Score:   5  CONSTITUTIONAL: Alert and oriented times 3, no acute distress and cooperative to examination. SKIN: Skin color, texture, turgor normal. No rashes or lesions. INCISION: appears to be granulating well, no exudate, serous drainage. Thank you for the interesting evaluation. Further recommendations as listed above.      Electronically signed by Michelle Davis DO on 6/25/2019 at 12:16 PM

## 2019-06-25 ENCOUNTER — OFFICE VISIT (OUTPATIENT)
Dept: SURGERY | Age: 24
End: 2019-06-25
Payer: COMMERCIAL

## 2019-06-25 VITALS
DIASTOLIC BLOOD PRESSURE: 60 MMHG | HEART RATE: 96 BPM | TEMPERATURE: 98.5 F | RESPIRATION RATE: 18 BRPM | BODY MASS INDEX: 25.07 KG/M2 | OXYGEN SATURATION: 98 % | WEIGHT: 156 LBS | SYSTOLIC BLOOD PRESSURE: 110 MMHG | HEIGHT: 66 IN

## 2019-06-25 DIAGNOSIS — Z98.890 S/P SURGICAL REMOVAL OF PILONIDAL CYST: Primary | ICD-10-CM

## 2019-06-25 PROCEDURE — G8427 DOCREV CUR MEDS BY ELIG CLIN: HCPCS | Performed by: SURGERY

## 2019-06-25 PROCEDURE — 99211 OFF/OP EST MAY X REQ PHY/QHP: CPT | Performed by: SURGERY

## 2019-06-25 PROCEDURE — G8419 CALC BMI OUT NRM PARAM NOF/U: HCPCS | Performed by: SURGERY

## 2019-06-25 RX ORDER — DIPHENHYDRAMINE HCL 25 MG
25 TABLET ORAL EVERY 6 HOURS PRN
COMMUNITY
End: 2021-10-13

## 2019-06-25 NOTE — LETTER
Lizet DO Wojciech  [address] [address] [place]  [phone]     Pt Name: Judy Smith  Medical Record Number: [id number]  Date of Birth 1995   Today's Date: 6/25/2019    Montez Hood was evaluated in the office today. My assessment and plans are listed below. ASSESSMENT         Diagnosis Orders   1. S/P surgical removal of pilonidal cyst      5/30/19   Wound is healing as expected with appropriate granulation tissue present\"  PLANS:      Pathology reviewed with the patient who understands. All questions were answered. Continue daily and prn dressing changes with AMD dressings  Follow up: Return in about 2 weeks (around 7/9/2019). If I can provide any additional assistance or you have any concerns, please feel free to contact me. Thank you for allowing to participate in the care of your patients. Sincerely,      Silvia Tavares.  Mandeep Nelson

## 2019-07-09 ENCOUNTER — OFFICE VISIT (OUTPATIENT)
Dept: SURGERY | Age: 24
End: 2019-07-09
Payer: COMMERCIAL

## 2019-07-09 VITALS
WEIGHT: 154 LBS | SYSTOLIC BLOOD PRESSURE: 110 MMHG | DIASTOLIC BLOOD PRESSURE: 68 MMHG | HEART RATE: 78 BPM | HEIGHT: 66 IN | TEMPERATURE: 97.1 F | BODY MASS INDEX: 24.75 KG/M2 | RESPIRATION RATE: 18 BRPM | OXYGEN SATURATION: 98 %

## 2019-07-09 DIAGNOSIS — Z98.890 S/P SURGICAL REMOVAL OF PILONIDAL CYST: Primary | ICD-10-CM

## 2019-07-09 PROCEDURE — G8427 DOCREV CUR MEDS BY ELIG CLIN: HCPCS | Performed by: SURGERY

## 2019-07-09 PROCEDURE — G8420 CALC BMI NORM PARAMETERS: HCPCS | Performed by: SURGERY

## 2019-07-09 PROCEDURE — 99211 OFF/OP EST MAY X REQ PHY/QHP: CPT | Performed by: SURGERY

## 2019-07-09 RX ORDER — AMOXICILLIN AND CLAVULANATE POTASSIUM 500; 125 MG/1; MG/1
1 TABLET, FILM COATED ORAL EVERY 12 HOURS
Qty: 20 TABLET | Refills: 0 | Status: SHIPPED | OUTPATIENT
Start: 2019-07-09 | End: 2019-07-19

## 2019-07-19 ENCOUNTER — OFFICE VISIT (OUTPATIENT)
Dept: SURGERY | Age: 24
End: 2019-07-19
Payer: COMMERCIAL

## 2019-07-19 VITALS
DIASTOLIC BLOOD PRESSURE: 64 MMHG | TEMPERATURE: 98.2 F | HEART RATE: 85 BPM | RESPIRATION RATE: 16 BRPM | OXYGEN SATURATION: 99 % | SYSTOLIC BLOOD PRESSURE: 94 MMHG

## 2019-07-19 DIAGNOSIS — Z98.890 S/P SURGICAL REMOVAL OF PILONIDAL CYST: Primary | ICD-10-CM

## 2019-07-19 PROCEDURE — 99211 OFF/OP EST MAY X REQ PHY/QHP: CPT | Performed by: SURGERY

## 2019-07-19 PROCEDURE — G8427 DOCREV CUR MEDS BY ELIG CLIN: HCPCS | Performed by: SURGERY

## 2019-07-19 PROCEDURE — G8420 CALC BMI NORM PARAMETERS: HCPCS | Performed by: SURGERY

## 2019-07-19 NOTE — LETTER
Shahnaz Burgess,   [address] [facility]. [address] [address]  [place] [address]  [phone]     Pt Name: Michael Tracey  Medical Record Number: [id number]  Date of Birth 1995   Today's Date: 7/19/2019    Yaquelin Jackson was evaluated in the office today. My assessment and plans are listed below. ASSESSMENT      Diagnosis Orders   1. S/P surgical removal of pilonidal cyst      5/30/19   Wound is healing as expected with appropriate granulation tissue present\"  PLANS:   Wound has nearly healed  Follow up: Return for As needed. Instructed to call if any concerns. If I can provide any additional assistance or you have any concerns, please feel free to contact me. Thank you for allowing to participate in the care of your patients. Sincerely,      Sully Dumont.  Mandeep Nelson

## 2021-04-23 ENCOUNTER — NURSE ONLY (OUTPATIENT)
Dept: LAB | Age: 26
End: 2021-04-23

## 2021-10-13 ENCOUNTER — OFFICE VISIT (OUTPATIENT)
Dept: CARDIOLOGY CLINIC | Age: 26
End: 2021-10-13
Payer: COMMERCIAL

## 2021-10-13 VITALS
HEART RATE: 92 BPM | WEIGHT: 150.2 LBS | DIASTOLIC BLOOD PRESSURE: 77 MMHG | SYSTOLIC BLOOD PRESSURE: 117 MMHG | BODY MASS INDEX: 25.02 KG/M2 | HEIGHT: 65 IN

## 2021-10-13 DIAGNOSIS — Q21.0 VSD (VENTRICULAR SEPTAL DEFECT): Primary | ICD-10-CM

## 2021-10-13 PROCEDURE — 93000 ELECTROCARDIOGRAM COMPLETE: CPT | Performed by: INTERNAL MEDICINE

## 2021-10-13 PROCEDURE — 99204 OFFICE O/P NEW MOD 45 MIN: CPT | Performed by: INTERNAL MEDICINE

## 2021-10-13 NOTE — PROGRESS NOTES
[facility] [address]  [place] [address]  Dept: [phone]  Dept Fax: [phone]  Loc: [phone]    Visit Date: 10/13/2021    Ms. Dunne is a 27 y.o. female  who presented for:  VSD    HPI:   HPI     26 y/o new patient. Hx of Anxiety and migraines. No echo on file. No hx of familial HLD . No known hx of PVD or DVTs. Pt has a hx of congenital ventricular septal defect. Pt believes she has an orthopedic disorder, however, testing was never done because it wasn't thought that it would be beneficial. Pt has hx of cholecystectomy, and she had atelectasis after surgery. Pt does have atypical chest pains sometimes, but does not seem like anything of concern, does not have any family history of early CAD. Does not complain of palpitations. Does complain of a little bit SOB that started after surgery. No leg swelling. She can do all of her ADLs. She works a school guidance counselor. She does not use any major a/t/d. She used to see a congenital cardiologist years prior, she was going to have it repaired as a child, but she \"missed her window. \"  She has not needed any cardiac procedures. When she was born, there is no history of being born blue, or poor growth, or not meeting milestones. There is no family hx of congenital issues.       Current Outpatient Medications:     diphenhydrAMINE (BENADRYL) 25 MG tablet, Take 25 mg by mouth every 6 hours as needed for Itching, Disp: , Rfl:     Gauze Pads & Dressings (CURITY AMD ANTIMICROBIAL SPNGE) 4\"X4\" PADS, 1 patch by Does not apply route daily, Disp: 30 each, Rfl: 2    albuterol (PROVENTIL) (5 MG/ML) 0.5% nebulizer solution, Take 1 mL by nebulization 4 times daily as needed for Wheezing (Patient not taking: Reported on 7/19/2019), Disp: 120 each, Rfl: 3    calcium carbonate (TUMS) 500 MG chewable tablet, Take 1 tablet by mouth After every meal, Disp: , Rfl:     Multiple Vitamins-Minerals (THERAPEUTIC MULTIVITAMIN-MINERALS) tablet, Take 1 tablet by mouth daily, Disp: , Rfl:     lansoprazole (PREVACID) 30 MG capsule, Take 30 mg by mouth daily. , Disp: , Rfl:     Past Medical History  Marcelle Wild  has a past medical history of Camptodactyly, Fatigue, GERD (gastroesophageal reflux disease), Headache(784.0), Hormone disorder, Idiopathic hypersomnia, and Septal defect. Social History  Marcelle Wild  reports that she has never smoked. She has never used smokeless tobacco. She reports that she does not drink alcohol and does not use drugs. Family History  Marcelle Wild family history includes Cancer in her maternal grandfather; Depression in her maternal grandmother; Diabetes in her maternal grandmother; High Blood Pressure in her paternal grandmother; Migraines in her paternal grandmother; Stroke in an other family member. There is no family history of bicuspid aortic valve, aneurysms, heart transplant, pacemakers, defibrillators, or sudden cardiac death. Past Surgical History   Past Surgical History:   Procedure Laterality Date    CHOLECYSTECTOMY  2018    collapsed lung during extubation.  Hosp X 2 days after    FINGER SURGERY Left 2004    5th digit straightening due to camptodactyly - , [facility]    FINGER SURGERY      6 finger straightenings with Dr. Delilah Gomez @ [facility] Every ARTHROSCOPY  2011    [facility] - Dr. Katie Kearney   [facility] N/A 5/30/2019    PILONIDAL CYSTECTOMY performed by Javier Brar DO at [address] LAP,CHOLECYSTECTOMY N/A 3/8/2018    CHOLECYSTECTOMY LAPAROSCOPIC, POSS OPEN performed by Javier Brar DO at [address] OFFICE/OUTPT [address] [facility] N/A 11/16/2018    DILATATION AND CURETTAGE HYSTEROSCOPY, DIAGNOSTIC LAPAROSCOPY, lysis of adhesions, and removal of omentum performed by Lolly Ho DO at [facility] ARTHROSCOPY Right     TONSILLECTOMY AND ADENOIDECTOMY      WISDOM TOOTH EXTRACTION  01/2018       Review of Systems   Constitutional: Negative for chills and fever  HENT: Negative for congestion, sinus pressure, sneezing and sore throat. Eyes: Negative for pain, discharge, redness and itching. Respiratory: Negative for apnea, cough  Gastrointestinal: Negative for blood in stool, constipation, diarrhea   Endocrine: Negative for cold intolerance, heat intolerance, polydipsia. Genitourinary: Negative for dysuria, enuresis, flank pain and hematuria. Musculoskeletal: Negative for arthralgias, joint swelling and neck pain. Neurological: Negative for numbness and headaches. Psychiatric/Behavioral: Negative for agitation, confusion, decreased concentration and dysphoric mood. Objective: There were no vitals taken for this visit. Wt Readings from Last 3 Encounters:   07/09/19 154 lb (69.9 kg)   06/25/19 156 lb (70.8 kg)   06/07/19 155 lb (70.3 kg)     BP Readings from Last 3 Encounters:   07/19/19 94/64   07/09/19 110/68   06/25/19 110/60       Nursing note and vitals reviewed. Physical Exam   Constitutional: Oriented to person, place, and time. Appears well-developed and well-nourished. HENT:   Head: Normocephalic and atraumatic. Eyes: EOM are normal. Pupils are equal, round, and reactive to light. Neck: Normal range of motion. Neck supple. No JVD present. Cardiovascular: Normal rate, regular rhythm, normal heart sounds and intact distal pulses. No murmur heard. Pulmonary/Chest: Effort normal and breath sounds normal. No respiratory distress. No wheezes. No rales. Abdominal: Soft. Bowel sounds are normal. No distension. There is no tenderness. Musculoskeletal: Normal range of motion. No edema. Neurological: Alert and oriented to person, place, and time. No cranial nerve deficit. Coordination normal.   Skin: Skin is warm and dry. Psychiatric: Normal mood and affect.        No results found for: CKTOTAL, CKMB, CKMBINDEX    Lab Results   Component Value Date    WBC 10.5 04/20/2021    RBC 3.98 04/20/2021    RBC 4.44 03/30/2012    HGB 12.7 04/20/2021    HCT 36.8 04/20/2021    MCV 92.4 04/20/2021    MCH 32.0 04/20/2021    MCHC 34.6 04/20/2021    RDW 12.5 04/20/2021     04/20/2021    MPV 9.2 04/11/2019       Lab Results   Component Value Date     04/20/2021    K 3.7 04/20/2021     04/20/2021    CO2 26 04/20/2021    BUN 16 04/20/2021    LABALBU 4.4 04/20/2021    LABALBU 5.2 03/30/2012    CREATININE 0.82 04/20/2021    CALCIUM 9.70 04/20/2021    LABGLOM >90 04/11/2019    GLUCOSE 91 04/20/2021       Lab Results   Component Value Date    ALKPHOS 42 04/20/2021    ALT 12 04/20/2021    AST 19 04/20/2021    PROT 6.8 04/20/2021    BILITOT 0.3 04/20/2021    BILIDIR <0.1 04/20/2021    LABALBU 4.4 04/20/2021    LABALBU 5.2 03/30/2012       No results found for: MG    No results found for: INR, PROTIME      No results found for: LABA1C    No results found for: TRIG, HDL, LDLCALC, LDLDIRECT, LABVLDL    No results found for: TSH      Testing Reviewed:      I have individually reviewed the cardiac test below:    ECHO: No results found for this or any previous visit. Assessment/Plan   Hx of congenital VSD. No echo on file, but pt has been diagnosed with it when she was born. Surgical hx of cholecystectomy and cyst removal.   ? Osteogenesis imperfecta-type syndrome vs Learta Sport - no clear genetic testing. Plan:    1. Echo to evaluate VSD     2. F/u 1 year       Disposition:  1 year    Electronically signed by Apryl Boston   10/13/2021 at 3:25 PM EDT    Attending Supervising [de-identified] Attestation Statement  I performed a history and physical examination on the patient and discussed the management with the resident physician/med student. I reviewed and agree with the findings and plan as documented in the resident's/med student's note except for as noted below. 27 asymptomatic female with normal development presents with hx of VSD.   No audible murmur on exam.  BP stable. No concerns for PH. No volume on exam.  Check TTE, the VSD may have closed, consider cardiac MRI vs ARVIND for more definite diagnosis if TTE is not conclusive. No family hx. Discussed diet/exercise/BP/weight loss/health lifestyle choices/lipids; the patient understands the goals and will try to comply.     Electronically signed by Tyree Astorga MD on 10/15/21 at 9:01 PM EDT

## 2021-10-19 ENCOUNTER — TELEPHONE (OUTPATIENT)
Dept: CARDIOLOGY CLINIC | Age: 26
End: 2021-10-19

## 2021-10-19 NOTE — TELEPHONE ENCOUNTER
Left several messages for patient to call so we can get her echo scheduled. She is not returning my calls. Special instructions for echo is . Pinky Angles ...looking for VSD. ....  needs to be in notes

## 2021-10-19 NOTE — TELEPHONE ENCOUNTER
Pt left msg stating called and scheduled echo for Thursday, 10-21-21    Added to notes \"looking for VSD/yadav\"

## 2021-10-21 ENCOUNTER — HOSPITAL ENCOUNTER (OUTPATIENT)
Dept: NON INVASIVE DIAGNOSTICS | Age: 26
Discharge: HOME | End: 2021-10-21
Payer: COMMERCIAL

## 2021-10-21 DIAGNOSIS — Q21.0 VSD (VENTRICULAR SEPTAL DEFECT): ICD-10-CM

## 2021-10-21 LAB
LV EF: 53 %
LVEF MODALITY: NORMAL

## 2021-10-21 PROCEDURE — 93306 TTE W/DOPPLER COMPLETE: CPT

## 2021-10-25 NOTE — TELEPHONE ENCOUNTER
Results of echo  Summary   Ejection fraction was estimated at 50-55%. There was trace aortic regurgitation. There was trace tricuspid regurgitation. No VSD is seen, consider ARVIND or CMR if clinically indicated. Do you want anymore testing?

## 2022-01-26 ENCOUNTER — HOSPITAL ENCOUNTER (EMERGENCY)
Age: 27
Discharge: HOME | End: 2022-01-26
Attending: INTERNAL MEDICINE
Payer: COMMERCIAL

## 2022-01-26 ENCOUNTER — APPOINTMENT (OUTPATIENT)
Dept: CT IMAGING | Age: 27
End: 2022-01-26
Payer: COMMERCIAL

## 2022-01-26 VITALS
WEIGHT: 145 LBS | DIASTOLIC BLOOD PRESSURE: 66 MMHG | RESPIRATION RATE: 16 BRPM | SYSTOLIC BLOOD PRESSURE: 98 MMHG | TEMPERATURE: 98.2 F | OXYGEN SATURATION: 99 % | HEIGHT: 65 IN | HEART RATE: 94 BPM | BODY MASS INDEX: 24.16 KG/M2

## 2022-01-26 DIAGNOSIS — R10.31 ABDOMINAL PAIN, RIGHT LOWER QUADRANT: Primary | ICD-10-CM

## 2022-01-26 DIAGNOSIS — K59.00 CONSTIPATION, UNSPECIFIED CONSTIPATION TYPE: ICD-10-CM

## 2022-01-26 LAB
ALBUMIN SERPL-MCNC: 4.8 G/DL (ref 3.5–5.1)
ALP BLD-CCNC: 74 U/L (ref 38–126)
ALT SERPL-CCNC: 15 U/L (ref 11–66)
ANION GAP SERPL CALCULATED.3IONS-SCNC: 13 MEQ/L (ref 8–16)
AST SERPL-CCNC: 23 U/L (ref 5–40)
BACTERIA: ABNORMAL /HPF
BASOPHILS # BLD: 0.5 %
BASOPHILS ABSOLUTE: 0 THOU/MM3 (ref 0–0.1)
BILIRUB SERPL-MCNC: 0.2 MG/DL (ref 0.3–1.2)
BILIRUBIN URINE: NEGATIVE
BLOOD, URINE: NEGATIVE
BUN BLDV-MCNC: 9 MG/DL (ref 7–22)
CALCIUM SERPL-MCNC: 9.1 MG/DL (ref 8.5–10.5)
CASTS 2: ABNORMAL /LPF
CASTS UA: ABNORMAL /LPF
CHARACTER, URINE: ABNORMAL
CHLORIDE BLD-SCNC: 104 MEQ/L (ref 98–111)
CO2: 23 MEQ/L (ref 23–33)
COLOR: YELLOW
CREAT SERPL-MCNC: 0.7 MG/DL (ref 0.4–1.2)
CRYSTALS, UA: ABNORMAL
EOSINOPHIL # BLD: 0.2 %
EOSINOPHILS ABSOLUTE: 0 THOU/MM3 (ref 0–0.4)
EPITHELIAL CELLS, UA: ABNORMAL /HPF
ERYTHROCYTE [DISTWIDTH] IN BLOOD BY AUTOMATED COUNT: 12.1 % (ref 11.5–14.5)
ERYTHROCYTE [DISTWIDTH] IN BLOOD BY AUTOMATED COUNT: 42.5 FL (ref 35–45)
GFR SERPL CREATININE-BSD FRML MDRD: > 90 ML/MIN/1.73M2
GLUCOSE BLD-MCNC: 100 MG/DL (ref 70–108)
GLUCOSE URINE: NEGATIVE MG/DL
HCT VFR BLD CALC: 40.5 % (ref 37–47)
HEMOGLOBIN: 13.3 GM/DL (ref 12–16)
IMMATURE GRANS (ABS): 0.03 THOU/MM3 (ref 0–0.07)
IMMATURE GRANULOCYTES: 0.4 %
KETONES, URINE: 15
LACTIC ACID: 1 MMOL/L (ref 0.5–2)
LEUKOCYTE ESTERASE, URINE: NEGATIVE
LYMPHOCYTES # BLD: 17.5 %
LYMPHOCYTES ABSOLUTE: 1.5 THOU/MM3 (ref 1–4.8)
MCH RBC QN AUTO: 31.4 PG (ref 26–33)
MCHC RBC AUTO-ENTMCNC: 32.8 GM/DL (ref 32.2–35.5)
MCV RBC AUTO: 95.7 FL (ref 81–99)
MISCELLANEOUS 2: ABNORMAL
MONOCYTES # BLD: 5.6 %
MONOCYTES ABSOLUTE: 0.5 THOU/MM3 (ref 0.4–1.3)
NITRITE, URINE: NEGATIVE
NUCLEATED RED BLOOD CELLS: 0 /100 WBC
OSMOLALITY CALCULATION: 278.2 MOSMOL/KG (ref 275–300)
PH UA: 7.5 (ref 5–9)
PLATELET # BLD: 222 THOU/MM3 (ref 130–400)
PMV BLD AUTO: 9.6 FL (ref 9.4–12.4)
POTASSIUM REFLEX MAGNESIUM: 3.7 MEQ/L (ref 3.5–5.2)
PREGNANCY, SERUM: NEGATIVE
PROTEIN UA: NEGATIVE
RBC # BLD: 4.23 MILL/MM3 (ref 4.2–5.4)
RBC URINE: ABNORMAL /HPF
RENAL EPITHELIAL, UA: ABNORMAL
SEG NEUTROPHILS: 75.8 %
SEGMENTED NEUTROPHILS ABSOLUTE COUNT: 6.4 THOU/MM3 (ref 1.8–7.7)
SODIUM BLD-SCNC: 140 MEQ/L (ref 135–145)
SPECIFIC GRAVITY, URINE: 1.02 (ref 1–1.03)
TOTAL PROTEIN: 7 G/DL (ref 6.1–8)
UROBILINOGEN, URINE: 0.2 EU/DL (ref 0–1)
WBC # BLD: 8.5 THOU/MM3 (ref 4.8–10.8)
WBC UA: ABNORMAL /HPF
YEAST: ABNORMAL

## 2022-01-26 PROCEDURE — 74177 CT ABD & PELVIS W/CONTRAST: CPT

## 2022-01-26 PROCEDURE — 85025 COMPLETE CBC W/AUTO DIFF WBC: CPT

## 2022-01-26 PROCEDURE — 84703 CHORIONIC GONADOTROPIN ASSAY: CPT

## 2022-01-26 PROCEDURE — 83605 ASSAY OF LACTIC ACID: CPT

## 2022-01-26 PROCEDURE — 36415 COLL VENOUS BLD VENIPUNCTURE: CPT

## 2022-01-26 PROCEDURE — 6360000004 HC RX CONTRAST MEDICATION: Performed by: INTERNAL MEDICINE

## 2022-01-26 PROCEDURE — 81001 URINALYSIS AUTO W/SCOPE: CPT

## 2022-01-26 PROCEDURE — 80053 COMPREHEN METABOLIC PANEL: CPT

## 2022-01-26 PROCEDURE — 99283 EMERGENCY DEPT VISIT LOW MDM: CPT

## 2022-01-26 RX ORDER — MAGNESIUM CARB/ALUMINUM HYDROX 105-160MG
296 TABLET,CHEWABLE ORAL ONCE
Qty: 296 ML | Refills: 0 | Status: SHIPPED | OUTPATIENT
Start: 2022-01-26 | End: 2022-01-26

## 2022-01-26 RX ORDER — LACTULOSE 10 G/10G
10 SOLUTION ORAL 3 TIMES DAILY
Qty: 90 PACKET | Refills: 0 | Status: SHIPPED | OUTPATIENT
Start: 2022-01-26 | End: 2022-02-25

## 2022-01-26 RX ADMIN — IOPAMIDOL 80 ML: 755 INJECTION, SOLUTION INTRAVENOUS at 14:18

## 2022-01-26 ASSESSMENT — ENCOUNTER SYMPTOMS
CONSTIPATION: 0
NAUSEA: 1
VOMITING: 1
SHORTNESS OF BREATH: 0
ALLERGIC/IMMUNOLOGIC NEGATIVE: 1
ABDOMINAL PAIN: 1
DIARRHEA: 0
RESPIRATORY NEGATIVE: 1

## 2022-01-26 ASSESSMENT — PAIN SCALES - GENERAL: PAINLEVEL_OUTOF10: 8

## 2022-01-26 NOTE — ED TRIAGE NOTES
Pt to ED due to lower right abdominal pain. Pt states that the pain started last night. This morning the patient woke up with fever and nausea.  VSS

## 2022-01-26 NOTE — ED PROVIDER NOTES
[place] ENCOUNTER          Pt Name: Daniel Lebron  MRN: [id number]  [place] 1995  Date of evaluation: 1/26/2022  Treating Resident Physician: Lisandro Lake DO  Supervising Physician: Dr. Salinas Walker       Chief Complaint   Patient presents with    Abdominal Pain    Fever    Nausea     History obtained from the patient. HISTORY OF PRESENT ILLNESS    The patient is a 28 y/o female with PMH GERD. She is here for evaluation of abdomen pain. She states that last night, she developed abdomen pain in her RLQ that she describes as sharp and achy, constant, and different from her previous reflux episodes. The pain does not spread, is worse with movement and pushing on the area, and is an 8/10 in severity. She has also started to get chills, nausea, dry heaves, and fatigue, and had a temperature of 99F last night. She also states that her R leg has started to feel numb because of her pain. She denies any current fever, chest pain, SOB, vomiting, headache, dizziness, diarrhea, constipation, blood in the stool, changes in urination, vaginal discharge, or any other symptoms. She states that urinating worsens the pain in her abdomen but denies any other urinary symptoms. She admits to having unprotected sex with her  a few days ago and denies consuming any unusual foods. REVIEW OF SYSTEMS   Review of Systems   Constitutional: Positive for chills and fatigue. Negative for fever. Max 99F   HENT: Negative. Respiratory: Negative. Negative for shortness of breath. Cardiovascular: Negative. Negative for chest pain. Gastrointestinal: Positive for abdominal pain, nausea and vomiting. Negative for constipation and diarrhea. Dry heaves   Endocrine: Negative. Genitourinary: Negative. Negative for difficulty urinating and vaginal discharge. Musculoskeletal: Negative. Skin: Negative. Allergic/Immunologic: Negative. Neurological: Positive for numbness. Negative for dizziness and headaches. \"R leg numb due to pain\"   Psychiatric/Behavioral: Negative. PAST MEDICAL AND SURGICAL HISTORY     Past Medical History:   Diagnosis Date    Camptodactyly     Fatigue     GERD (gastroesophageal reflux disease)     Headache(784.0)     Hormone disorder     over active prolactin hormone    Idiopathic hypersomnia     Septal defect     heart defect     Past Surgical History:   Procedure Laterality Date    CHOLECYSTECTOMY  2018    collapsed lung during extubation. Hosp X 2 days after    FINGER SURGERY Left 2004    5th digit straightening due to camptodactyly - , [facility]    FINGER SURGERY      6 finger straightenings with Dr. Rashaun Montanez @ [facility] ARTHROSCOPY  2011    [facility] - [facility] N/A 5/30/2019    PILONIDAL CYSTECTOMY performed by Nisha Tom DO at [address] [facility] N/A 3/8/2018    CHOLECYSTECTOMY LAPAROSCOPIC, POSS OPEN performed by Nisha Tom DO at [address] OFFICE/OUTPT [address] N/A 11/16/2018    DILATATION AND CURETTAGE HYSTEROSCOPY, DIAGNOSTIC LAPAROSCOPY, lysis of adhesions, and removal of omentum performed by Sarah Snell DO at [facility] ARTHROSCOPY Right     TONSILLECTOMY AND ADENOIDECTOMY      WISDOM TOOTH EXTRACTION  01/2018         MEDICATIONS   No current facility-administered medications for this encounter.     Current Outpatient Medications:     lactulose (CEPHULAC) 10 g packet, Take 1 packet by mouth 3 times daily, Disp: 90 packet, Rfl: 0    Magnesium Citrate 1.745 GM/30ML solution, Take 296 mLs by mouth once for 1 dose, Disp: 296 mL, Rfl: 0    Pantoprazole Sodium (PROTONIX PO), Take by mouth, Disp: , Rfl:     Omega-3 Fatty Acids (FISH OIL PO), Take by mouth, Disp: , Rfl:     PROGESTERONE PO, Take by mouth, Disp: , Rfl:    Probiotic Product (PROBIOTIC PO), Take by mouth, Disp: , Rfl:     ELDERBERRY PO, Take by mouth, Disp: , Rfl:     Loratadine (CLARITIN PO), Take by mouth, Disp: , Rfl:     Ascorbic Acid (MILAGROS-C PO), Take by mouth, Disp: , Rfl:     VITAMIN D PO, Take by mouth, Disp: , Rfl:     Multiple Vitamins-Minerals (THERAPEUTIC MULTIVITAMIN-MINERALS) tablet, Take 1 tablet by mouth daily, Disp: , Rfl:       SOCIAL HISTORY     Social History     Social History Narrative    ** Merged History Encounter **          Social History     Tobacco Use    Smoking status: Never Smoker    Smokeless tobacco: Never Used   Vaping Use    Vaping Use: Never used   Substance Use Topics    Alcohol use: No    Drug use: No         ALLERGIES     Allergies   Allergen Reactions    Latex Itching and Rash     Strong family history with need of epipen    Chloraprep One Step [Chlorhexidine Gluconate] Rash    Seasonal          FAMILY HISTORY     Family History   Problem Relation Age of Onset    Depression Maternal Grandmother     Diabetes Maternal Grandmother     Cancer Maternal Grandfather     Stroke Other         M and P great grandmother and grandfather    Migraines Paternal Grandmother     High Blood Pressure Paternal Grandmother          PREVIOUS RECORDS   Previous records reviewed: Previous ED visit on 4/11/19, 7/31/18. PHYSICAL EXAM     ED Triage Vitals   BP Temp Temp src Pulse Resp SpO2 Height Weight   01/26/22 1247 01/26/22 1247 -- 01/26/22 1247 01/26/22 1301 01/26/22 1247 01/26/22 1247 01/26/22 1247   123/83 98.2 °F (36.8 °C)  103 16 100 % 5' 5\" (1.651 m) 145 lb (65.8 kg)     Initial vital signs and nursing assessment reviewed and abnormal from pulse 103. Body mass index is 24.13 kg/m². Pulsoximetry is normal per my interpretation.     Additional Vital Signs:  Vitals:    01/26/22 1351   BP: 98/66   Pulse: 94   Resp: 16   Temp:    SpO2: 99%       Physical Exam  Constitutional:       Appearance: She is well-developed and normal weight. HENT:      Head: Normocephalic and atraumatic. Right Ear: External ear normal.      Left Ear: External ear normal.      Nose: Nose normal.      Mouth/Throat:      Mouth: Mucous membranes are moist.      Pharynx: Oropharynx is clear. Eyes:      Extraocular Movements: Extraocular movements intact. Pupils: Pupils are equal, round, and reactive to light. Cardiovascular:      Rate and Rhythm: Regular rhythm. Tachycardia present. Heart sounds: Normal heart sounds. No murmur heard. No friction rub. No gallop. Pulmonary:      Effort: Pulmonary effort is normal. No respiratory distress. Breath sounds: Normal breath sounds. No wheezing or rales. Abdominal:      General: Abdomen is flat. Bowel sounds are normal. There is no distension. There are no signs of injury. Palpations: Abdomen is soft. Tenderness: There is abdominal tenderness in the right lower quadrant. There is guarding and rebound. Positive signs include Rovsing's sign and McBurney's sign. Hernia: No hernia is present. Musculoskeletal:         General: Normal range of motion. Cervical back: Normal range of motion and neck supple. Skin:     General: Skin is warm. Neurological:      General: No focal deficit present. Mental Status: She is alert and oriented to person, place, and time. Motor: No weakness. Comments: No abnormalities noted in R leg   Psychiatric:         Mood and Affect: Mood normal.         Behavior: Behavior normal.             MEDICAL DECISION MAKING   Initial Assessment:   1. RLQ abdomen pain  2. High suspicion for appendicitis.   Plan:    CBC, CMP   UA, urine pregnancy   CT scan abdomen w/ contrast    ED RESULTS   Laboratory results:  Labs Reviewed   COMPREHENSIVE METABOLIC PANEL W/ REFLEX TO MG FOR LOW K - Abnormal; Notable for the following components:       Result Value    Total Bilirubin 0.2 (*)     All other components within normal limits   URINE WITH REFLEXED MICRO - Abnormal; Notable for the following components:    Ketones, Urine 15 (*)     All other components within normal limits   CBC WITH AUTO DIFFERENTIAL   HCG, SERUM, QUALITATIVE   LACTIC ACID, PLASMA   ANION GAP   OSMOLALITY   GLOMERULAR FILTRATION RATE, ESTIMATED       Radiologic studies results:  CT ABDOMEN PELVIS W IV CONTRAST Additional Contrast? None   Final Result      1. Very large amount of retained fecal material seen throughout the colon. No bowel obstruction or pericolonic inflammation observed. 2. Normal appendix. Stable chronic findings are discussed. **This report has been created using voice recognition software. It may contain minor errors which are inherent in voice recognition technology. **      Final report electronically signed by Dr Alivia Herron on 1/26/2022 2:31 PM          ED Medications administered this visit:   Medications   iopamidol (ISOVUE-370) 76 % injection 80 mL (80 mLs IntraVENous Given 1/26/22 1418)         ED COURSE     ED Course as of 01/26/22 1450   Wed Jan 26, 2022   1445 Patient's CT scan only showed retained fecal matter. She does not have any evidence of constipation. Remaining labs are unremarkable. Will discharge with lactulose and 1 bottle of magnesium citrate to be taken at home if the lactulose fails. [JK]      ED Course User Index  [JK] Lesa Parry DO       Strict return precautions and follow up instructions were discussed with the patient prior to discharge, with which the patient agrees. MEDICATION CHANGES     New Prescriptions    LACTULOSE ([address] [place]) 10 G PACKET    Take 1 packet by mouth 3 times daily    MAGNESIUM CITRATE 1.745 GM/30ML SOLUTION    Take 296 mLs by mouth once for 1 dose         FINAL DISPOSITION     Final diagnoses:   Abdominal pain, right lower quadrant   Constipation, unspecified constipation type     Condition: condition: stable  Dispo: Discharging to home      This transcription was electronically signed.  Parts of this transcriptions may have been dictated by use of voice recognition software and electronically transcribed, and parts may have been transcribed with the assistance of an ED scribe. The transcription may contain errors not detected in proofreading. Please refer to my supervising physician's documentation if my documentation differs.     Electronically Signed: Elizabeth Resendiz DO, 01/26/22, 2:50 PM       Elizabeth Resendiz DO  Resident  01/26/22 0522

## 2022-02-09 PROBLEM — Z87.74: Status: ACTIVE | Noted: 2022-02-09

## 2025-02-06 ENCOUNTER — HOSPITAL ENCOUNTER (INPATIENT)
Age: 30
LOS: 2 days | Discharge: HOME | End: 2025-02-08
Attending: OBSTETRICS & GYNECOLOGY | Admitting: OBSTETRICS & GYNECOLOGY
Payer: COMMERCIAL

## 2025-02-06 ENCOUNTER — ANESTHESIA (OUTPATIENT)
Dept: LABOR AND DELIVERY | Age: 30
End: 2025-02-06
Payer: COMMERCIAL

## 2025-02-06 ENCOUNTER — APPOINTMENT (OUTPATIENT)
Dept: LABOR AND DELIVERY | Age: 30
End: 2025-02-06
Payer: COMMERCIAL

## 2025-02-06 ENCOUNTER — ANESTHESIA EVENT (OUTPATIENT)
Dept: LABOR AND DELIVERY | Age: 30
End: 2025-02-06
Payer: COMMERCIAL

## 2025-02-06 PROBLEM — Z34.90 ENCOUNTER FOR INDUCTION OF LABOR: Status: ACTIVE | Noted: 2025-02-06

## 2025-02-06 LAB
ABO GROUP BLD: NORMAL
AMPHETAMINES UR QL SCN: NEGATIVE
BARBITURATES UR QL SCN: NEGATIVE
BASOPHILS ABSOLUTE: 0 THOU/MM3 (ref 0–0.1)
BASOPHILS NFR BLD AUTO: 0.5 %
BENZODIAZ UR QL SCN: NEGATIVE
BZE UR QL SCN: NEGATIVE
CANNABINOIDS UR QL SCN: NEGATIVE
DEPRECATED RDW RBC AUTO: 45.2 FL (ref 35–45)
EOSINOPHIL NFR BLD AUTO: 0.9 %
EOSINOPHILS ABSOLUTE: 0.1 THOU/MM3 (ref 0–0.4)
ERYTHROCYTE [DISTWIDTH] IN BLOOD BY AUTOMATED COUNT: 12.8 % (ref 11.5–14.5)
FENTANYL: NEGATIVE
HCT VFR BLD AUTO: 38.5 % (ref 37–47)
HGB BLD-MCNC: 12.6 GM/DL (ref 12–16)
IAT IGG-SP REAG SERPL QL: NORMAL
IMM GRANULOCYTES # BLD AUTO: 0.06 THOU/MM3 (ref 0–0.07)
IMM GRANULOCYTES NFR BLD AUTO: 0.6 %
LYMPHOCYTES ABSOLUTE: 1.7 THOU/MM3 (ref 1–4.8)
LYMPHOCYTES NFR BLD AUTO: 17.9 %
MCH RBC QN AUTO: 31.6 PG (ref 26–33)
MCHC RBC AUTO-ENTMCNC: 32.7 GM/DL (ref 32.2–35.5)
MCV RBC AUTO: 96.5 FL (ref 81–99)
MONOCYTES ABSOLUTE: 0.9 THOU/MM3 (ref 0.4–1.3)
MONOCYTES NFR BLD AUTO: 9.2 %
NEUTROPHILS ABSOLUTE: 6.8 THOU/MM3 (ref 1.8–7.7)
NEUTROPHILS NFR BLD AUTO: 70.9 %
NRBC BLD AUTO-RTO: 0 /100 WBC
OPIATES UR QL SCN: NEGATIVE
OXYCODONE: NEGATIVE
PCP UR QL SCN: NEGATIVE
PLATELET # BLD AUTO: 237 THOU/MM3 (ref 130–400)
PMV BLD AUTO: 10 FL (ref 9.4–12.4)
RBC # BLD AUTO: 3.99 MILL/MM3 (ref 4.2–5.4)
RH BLD: NORMAL
RPR SER QL: NONREACTIVE
WBC # BLD AUTO: 9.6 THOU/MM3 (ref 4.8–10.8)

## 2025-02-06 PROCEDURE — 86592 SYPHILIS TEST NON-TREP QUAL: CPT

## 2025-02-06 PROCEDURE — 86885 COOMBS TEST INDIRECT QUAL: CPT

## 2025-02-06 PROCEDURE — 6360000002 HC RX W HCPCS: Performed by: OBSTETRICS & GYNECOLOGY

## 2025-02-06 PROCEDURE — 2500000003 HC RX 250 WO HCPCS: Performed by: ANESTHESIOLOGY

## 2025-02-06 PROCEDURE — 86901 BLOOD TYPING SEROLOGIC RH(D): CPT

## 2025-02-06 PROCEDURE — 6360000002 HC RX W HCPCS: Performed by: ANESTHESIOLOGY

## 2025-02-06 PROCEDURE — 1220000001 HC SEMI PRIVATE L&D R&B

## 2025-02-06 PROCEDURE — 3700000025 EPIDURAL BLOCK: Performed by: ANESTHESIOLOGY

## 2025-02-06 PROCEDURE — 7200000001 HC VAGINAL DELIVERY

## 2025-02-06 PROCEDURE — 80307 DRUG TEST PRSMV CHEM ANLYZR: CPT

## 2025-02-06 PROCEDURE — 85025 COMPLETE CBC W/AUTO DIFF WBC: CPT

## 2025-02-06 PROCEDURE — 86900 BLOOD TYPING SEROLOGIC ABO: CPT

## 2025-02-06 PROCEDURE — 2580000003 HC RX 258: Performed by: OBSTETRICS & GYNECOLOGY

## 2025-02-06 RX ORDER — TERBUTALINE SULFATE 1 MG/ML
0.25 INJECTION, SOLUTION SUBCUTANEOUS
Status: DISCONTINUED | OUTPATIENT
Start: 2025-02-06 | End: 2025-02-07 | Stop reason: HOSPADM

## 2025-02-06 RX ORDER — MORPHINE SULFATE 2 MG/ML
2 INJECTION, SOLUTION INTRAMUSCULAR; INTRAVENOUS
Status: DISCONTINUED | OUTPATIENT
Start: 2025-02-06 | End: 2025-02-07 | Stop reason: HOSPADM

## 2025-02-06 RX ORDER — OXYTOCIN/0.9 % SODIUM CHLORIDE 30/500 ML
1-20 PLASTIC BAG, INJECTION (ML) INTRAVENOUS CONTINUOUS
Status: DISCONTINUED | OUTPATIENT
Start: 2025-02-06 | End: 2025-02-07 | Stop reason: HOSPADM

## 2025-02-06 RX ORDER — MORPHINE SULFATE 4 MG/ML
4 INJECTION, SOLUTION INTRAMUSCULAR; INTRAVENOUS
Status: DISCONTINUED | OUTPATIENT
Start: 2025-02-06 | End: 2025-02-07 | Stop reason: HOSPADM

## 2025-02-06 RX ORDER — MISOPROSTOL 200 UG/1
TABLET ORAL
Status: DISCONTINUED
Start: 2025-02-06 | End: 2025-02-07 | Stop reason: WASHOUT

## 2025-02-06 RX ORDER — ONDANSETRON 2 MG/ML
8 INJECTION INTRAMUSCULAR; INTRAVENOUS EVERY 6 HOURS PRN
Status: DISCONTINUED | OUTPATIENT
Start: 2025-02-06 | End: 2025-02-07

## 2025-02-06 RX ORDER — OXYCODONE HYDROCHLORIDE 5 MG/1
5 TABLET ORAL EVERY 4 HOURS PRN
Status: DISCONTINUED | OUTPATIENT
Start: 2025-02-06 | End: 2025-02-07 | Stop reason: HOSPADM

## 2025-02-06 RX ORDER — OXYCODONE HYDROCHLORIDE 5 MG/1
10 TABLET ORAL EVERY 4 HOURS PRN
Status: DISCONTINUED | OUTPATIENT
Start: 2025-02-06 | End: 2025-02-07 | Stop reason: HOSPADM

## 2025-02-06 RX ORDER — SODIUM CHLORIDE, SODIUM LACTATE, POTASSIUM CHLORIDE, AND CALCIUM CHLORIDE .6; .31; .03; .02 G/100ML; G/100ML; G/100ML; G/100ML
500 INJECTION, SOLUTION INTRAVENOUS PRN
Status: DISCONTINUED | OUTPATIENT
Start: 2025-02-06 | End: 2025-02-07 | Stop reason: HOSPADM

## 2025-02-06 RX ORDER — CARBOPROST TROMETHAMINE 250 UG/ML
250 INJECTION, SOLUTION INTRAMUSCULAR PRN
Status: DISCONTINUED | OUTPATIENT
Start: 2025-02-06 | End: 2025-02-07 | Stop reason: HOSPADM

## 2025-02-06 RX ORDER — FENTANYL CITRATE 50 UG/ML
50 INJECTION, SOLUTION INTRAMUSCULAR; INTRAVENOUS
Status: DISCONTINUED | OUTPATIENT
Start: 2025-02-06 | End: 2025-02-07 | Stop reason: HOSPADM

## 2025-02-06 RX ORDER — ONDANSETRON 2 MG/ML
4 INJECTION INTRAMUSCULAR; INTRAVENOUS EVERY 6 HOURS PRN
Status: DISCONTINUED | OUTPATIENT
Start: 2025-02-06 | End: 2025-02-07 | Stop reason: HOSPADM

## 2025-02-06 RX ORDER — DIPHENHYDRAMINE HCL 25 MG
25 TABLET ORAL EVERY 4 HOURS PRN
Status: DISCONTINUED | OUTPATIENT
Start: 2025-02-06 | End: 2025-02-07 | Stop reason: HOSPADM

## 2025-02-06 RX ORDER — SODIUM CHLORIDE, SODIUM LACTATE, POTASSIUM CHLORIDE, AND CALCIUM CHLORIDE .6; .31; .03; .02 G/100ML; G/100ML; G/100ML; G/100ML
1000 INJECTION, SOLUTION INTRAVENOUS PRN
Status: DISCONTINUED | OUTPATIENT
Start: 2025-02-06 | End: 2025-02-07 | Stop reason: HOSPADM

## 2025-02-06 RX ORDER — METHYLERGONOVINE MALEATE 0.2 MG/ML
200 INJECTION INTRAVENOUS PRN
Status: DISCONTINUED | OUTPATIENT
Start: 2025-02-06 | End: 2025-02-07 | Stop reason: HOSPADM

## 2025-02-06 RX ORDER — SODIUM CHLORIDE, SODIUM LACTATE, POTASSIUM CHLORIDE, CALCIUM CHLORIDE 600; 310; 30; 20 MG/100ML; MG/100ML; MG/100ML; MG/100ML
INJECTION, SOLUTION INTRAVENOUS CONTINUOUS
Status: DISCONTINUED | OUTPATIENT
Start: 2025-02-06 | End: 2025-02-07 | Stop reason: HOSPADM

## 2025-02-06 RX ORDER — DIPHENHYDRAMINE HYDROCHLORIDE 50 MG/ML
25 INJECTION INTRAMUSCULAR; INTRAVENOUS EVERY 4 HOURS PRN
Status: DISCONTINUED | OUTPATIENT
Start: 2025-02-06 | End: 2025-02-07 | Stop reason: HOSPADM

## 2025-02-06 RX ORDER — ACETAMINOPHEN 325 MG/1
650 TABLET ORAL EVERY 4 HOURS PRN
Status: DISCONTINUED | OUTPATIENT
Start: 2025-02-06 | End: 2025-02-07 | Stop reason: HOSPADM

## 2025-02-06 RX ORDER — MISOPROSTOL 200 UG/1
400 TABLET ORAL PRN
Status: DISCONTINUED | OUTPATIENT
Start: 2025-02-06 | End: 2025-02-07

## 2025-02-06 RX ORDER — ONDANSETRON 4 MG/1
4 TABLET, ORALLY DISINTEGRATING ORAL EVERY 6 HOURS PRN
Status: DISCONTINUED | OUTPATIENT
Start: 2025-02-06 | End: 2025-02-07

## 2025-02-06 RX ORDER — SEVOFLURANE 250 ML/250ML
1 LIQUID RESPIRATORY (INHALATION) CONTINUOUS PRN
Status: DISCONTINUED | OUTPATIENT
Start: 2025-02-06 | End: 2025-02-07 | Stop reason: HOSPADM

## 2025-02-06 RX ORDER — NALOXONE HYDROCHLORIDE 0.4 MG/ML
INJECTION, SOLUTION INTRAMUSCULAR; INTRAVENOUS; SUBCUTANEOUS PRN
Status: DISCONTINUED | OUTPATIENT
Start: 2025-02-06 | End: 2025-02-07 | Stop reason: HOSPADM

## 2025-02-06 RX ORDER — TRANEXAMIC ACID 10 MG/ML
1000 INJECTION, SOLUTION INTRAVENOUS
Status: DISCONTINUED | OUTPATIENT
Start: 2025-02-06 | End: 2025-02-07 | Stop reason: HOSPADM

## 2025-02-06 RX ORDER — OXYTOCIN/0.9 % SODIUM CHLORIDE 30/500 ML
87.3 PLASTIC BAG, INJECTION (ML) INTRAVENOUS PRN
Status: DISCONTINUED | OUTPATIENT
Start: 2025-02-06 | End: 2025-02-07

## 2025-02-06 RX ORDER — LIDOCAINE HYDROCHLORIDE 10 MG/ML
30 INJECTION, SOLUTION INFILTRATION; PERINEURAL PRN
Status: DISCONTINUED | OUTPATIENT
Start: 2025-02-06 | End: 2025-02-07 | Stop reason: HOSPADM

## 2025-02-06 RX ADMIN — Medication 166.7 ML: at 23:34

## 2025-02-06 RX ADMIN — SODIUM CHLORIDE, POTASSIUM CHLORIDE, SODIUM LACTATE AND CALCIUM CHLORIDE: 600; 310; 30; 20 INJECTION, SOLUTION INTRAVENOUS at 07:49

## 2025-02-06 RX ADMIN — Medication 87.3 MILLI-UNITS/MIN: at 23:34

## 2025-02-06 RX ADMIN — Medication 16 ML/HR: at 17:07

## 2025-02-06 RX ADMIN — SODIUM CHLORIDE, POTASSIUM CHLORIDE, SODIUM LACTATE AND CALCIUM CHLORIDE: 600; 310; 30; 20 INJECTION, SOLUTION INTRAVENOUS at 15:56

## 2025-02-06 RX ADMIN — Medication 1 MILLI-UNITS/MIN: at 08:04

## 2025-02-06 RX ADMIN — ONDANSETRON 4 MG: 2 INJECTION, SOLUTION INTRAMUSCULAR; INTRAVENOUS at 21:58

## 2025-02-06 NOTE — FLOWSHEET NOTE
Patient asking about epidural, states she is doing well now but will possibly want one in the near future. Discussed for patient to be hurting but not hurting so bad she can't sit still during the epidural. Patient states ok, continues to cope well during contractions. Spouse and support person at bedside.

## 2025-02-06 NOTE — FLOWSHEET NOTE
Patient arrived to the unit for scheduled induction for LGA, patient states this is her 1st baby, she is feeling him move like normal and denies any regular contractions or leaking of fluids. Patient shown to the RR for voiding and changing into gown.

## 2025-02-06 NOTE — FLOWSHEET NOTE
Dr. Rey messaged the unit for an update on pt. Updated her that patient is still not feeling contractions, she is on the ball, pit is on 6mu/hr, she has lost lots of amniotic fluid and contractions are 2-4 minutes apart.

## 2025-02-06 NOTE — FLOWSHEET NOTE
Text update to Dr. Rey, patient is now comfortable with epidural and 6cm. Will update physician at 1930.

## 2025-02-06 NOTE — H&P
Obstetric Admission Note        CHIEF COMPLAINT: for delivery    HISTORY OF PRESENT ILLNESS:                     The patient is a 29 y.o.  female @ 40+0 weeks with significant past medical history of GERD who presents for induction due to LGA.  Csxn vs HARDEEP were discussed extensively in office and pt desired a HARDEEP.  Good FM.  No SROM.         Past Medical History:        Diagnosis Date    Camptodactyly     Fatigue     GERD (gastroesophageal reflux disease)     Headache(784.0)     Hormone disorder     over active prolactin hormone    Idiopathic hypersomnia     Infertility, female     Septal defect     heart defect       Medications Prior to Admission:   Medications Prior to Admission: Pantoprazole Sodium (PROTONIX PO), Take by mouth  Omega-3 Fatty Acids (FISH OIL PO), Take by mouth  PROGESTERONE PO, Take by mouth  Probiotic Product (PROBIOTIC PO), Take by mouth  ELDERBERRY PO, Take by mouth  Loratadine (CLARITIN PO), Take by mouth  Ascorbic Acid (MILAGROS-C PO), Take by mouth  VITAMIN D PO, Take by mouth  Multiple Vitamins-Minerals (THERAPEUTIC MULTIVITAMIN-MINERALS) tablet, Take 1 tablet by mouth daily    Allergies:  Latex, Chloraprep one step [chlorhexidine gluconate], and Seasonal     DATA:    Cervical exam 3/80/-2  Membranes ruptured at 0856 clear  [facility]'s cat 1    ASSESSMENT AND PLAN:      Assessment problems  IUP @ 40+0wks  IOL    Plan:  - Admit to L&D  - Anticipate Vaginal Delivery  Tiffanie Rey D.O.

## 2025-02-06 NOTE — ANESTHESIA PROCEDURE NOTES
Epidural Block    Patient location during procedure: OB  Reason for block: labor epidural  Staffing  Performed: anesthesiologist   Anesthesiologist: George Shultz MD  Performed by: George Shultz MD  Authorized by: George Shultz MD    Epidural  Patient position: sitting  Prep: ChloraPrep  Patient monitoring: continuous pulse ox and frequent blood pressure checks  Approach: midline  Location: L4-5  Injection technique: NAYAN saline  Provider prep: sterile gloves and mask  Needle  Needle type: Tuohy   Needle gauge: 18 G  Needle length: 3.5 in  Needle insertion depth: 5 cm  Catheter type: multi-orifice  Catheter at skin depth: 10 cm  Test dose: negativeCatheter Secured: tegaderm and tape  Assessment  Hemodynamics: stable  Attempts: 1  Outcomes: uncomplicated  Additional Notes  2 initial attempts by CRNA with blood upon aspiration of catheter (1st attempt) and positive test dose (2nd attempt).  Preanesthetic Checklist  Completed: patient identified, IV checked, site marked, risks and benefits discussed, surgical/procedural consents, equipment checked, pre-op evaluation, timeout performed, anesthesia consent given, oxygen available, monitors applied/VS acknowledged, fire risk safety assessment completed and verbalized and blood product R/B/A discussed and consented

## 2025-02-06 NOTE — ANESTHESIA PRE PROCEDURE
Department of Anesthesiology  Preprocedure Note       Name:  Yajaira Dunne   Age:  29 y.o.  :  1995                                          MRN:  [id number]         Date:  2025      Surgeon: * No surgeons listed *    Procedure: * No procedures listed *    Medications prior to admission:   Prior to Admission medications    Medication Sig Start Date End Date Taking? Authorizing Provider   Pantoprazole Sodium (PROTONIX PO) Take by mouth   Yes ProviderJovita MD   Omega-3 Fatty Acids (FISH OIL PO) Take by mouth    ProviderJovita MD   PROGESTERONE PO Take by mouth    ProviderJovita MD   Probiotic Product (PROBIOTIC PO) Take by mouth    ProviderJovita MD   ELDERBERRY PO Take by mouth    Jovita Baum MD   Loratadine (CLARITIN PO) Take by mouth    Jovita Baum MD   Ascorbic Acid (MILAGROS-C PO) Take by mouth    Jovita Baum MD   VITAMIN D PO Take by mouth    Jovita Baum MD   Multiple Vitamins-Minerals (THERAPEUTIC MULTIVITAMIN-MINERALS) tablet Take 1 tablet by mouth daily    ProviderJovita MD       Current medications:    Current Facility-Administered Medications   Medication Dose Route Frequency Provider Last Rate Last Admin    oxytocin (PITOCIN) 30 units in 500 mL infusion  1-20 shawna-units/min IntraVENous Continuous Tiffanie Rey DO 6 mL/hr at 25 1422 6 shawna-units/min at 25 1422    terbutaline (BRETHINE) injection 0.25 mg  0.25 mg SubCUTAneous Once PRN Tiffanie Rey DO        lactated ringers infusion   IntraVENous Continuous Tiffanie Rey  mL/hr at 25 1556 New Bag at 25 1556    lactated ringers bolus 500 mL  500 mL IntraVENous PRN Tiffanie Rey DO        Or    lactated ringers bolus 1,000 mL  1,000 mL IntraVENous PRN Tiffanie Rey, DO        lidocaine 1 % injection 30 mL  30 mL Other PRN Tiffanie Rey DO        fentaNYL (SUBLIMAZE) injection 50 mcg  50 mcg IntraVENous Q2H

## 2025-02-07 PROCEDURE — 10907ZC DRAINAGE OF AMNIOTIC FLUID, THERAPEUTIC FROM PRODUCTS OF CONCEPTION, VIA NATURAL OR ARTIFICIAL OPENING: ICD-10-PCS | Performed by: OBSTETRICS & GYNECOLOGY

## 2025-02-07 PROCEDURE — 0KQM0ZZ REPAIR PERINEUM MUSCLE, OPEN APPROACH: ICD-10-PCS | Performed by: OBSTETRICS & GYNECOLOGY

## 2025-02-07 PROCEDURE — 1200000000 HC SEMI PRIVATE

## 2025-02-07 PROCEDURE — 6370000000 HC RX 637 (ALT 250 FOR IP): Performed by: OBSTETRICS & GYNECOLOGY

## 2025-02-07 PROCEDURE — 0UQMXZZ REPAIR VULVA, EXTERNAL APPROACH: ICD-10-PCS | Performed by: OBSTETRICS & GYNECOLOGY

## 2025-02-07 PROCEDURE — 3E033VJ INTRODUCTION OF OTHER HORMONE INTO PERIPHERAL VEIN, PERCUTANEOUS APPROACH: ICD-10-PCS | Performed by: OBSTETRICS & GYNECOLOGY

## 2025-02-07 RX ORDER — SODIUM CHLORIDE 0.9 % (FLUSH) 0.9 %
5-40 SYRINGE (ML) INJECTION EVERY 12 HOURS SCHEDULED
Status: DISCONTINUED | OUTPATIENT
Start: 2025-02-07 | End: 2025-02-08 | Stop reason: HOSPADM

## 2025-02-07 RX ORDER — MISOPROSTOL 200 UG/1
800 TABLET ORAL PRN
Status: DISCONTINUED | OUTPATIENT
Start: 2025-02-07 | End: 2025-02-08 | Stop reason: HOSPADM

## 2025-02-07 RX ORDER — SODIUM CHLORIDE 9 MG/ML
INJECTION, SOLUTION INTRAVENOUS PRN
Status: DISCONTINUED | OUTPATIENT
Start: 2025-02-07 | End: 2025-02-08 | Stop reason: HOSPADM

## 2025-02-07 RX ORDER — ONDANSETRON 2 MG/ML
4 INJECTION INTRAMUSCULAR; INTRAVENOUS EVERY 6 HOURS PRN
Status: DISCONTINUED | OUTPATIENT
Start: 2025-02-07 | End: 2025-02-08 | Stop reason: HOSPADM

## 2025-02-07 RX ORDER — IBUPROFEN 800 MG/1
800 TABLET, FILM COATED ORAL EVERY 8 HOURS
Status: DISCONTINUED | OUTPATIENT
Start: 2025-02-07 | End: 2025-02-08 | Stop reason: HOSPADM

## 2025-02-07 RX ORDER — SODIUM CHLORIDE, SODIUM LACTATE, POTASSIUM CHLORIDE, CALCIUM CHLORIDE 600; 310; 30; 20 MG/100ML; MG/100ML; MG/100ML; MG/100ML
INJECTION, SOLUTION INTRAVENOUS CONTINUOUS
Status: DISCONTINUED | OUTPATIENT
Start: 2025-02-07 | End: 2025-02-08 | Stop reason: HOSPADM

## 2025-02-07 RX ORDER — DOCUSATE SODIUM 100 MG/1
100 CAPSULE, LIQUID FILLED ORAL 2 TIMES DAILY
Status: DISCONTINUED | OUTPATIENT
Start: 2025-02-07 | End: 2025-02-08 | Stop reason: HOSPADM

## 2025-02-07 RX ORDER — METHYLERGONOVINE MALEATE 0.2 MG/ML
200 INJECTION INTRAVENOUS PRN
Status: DISCONTINUED | OUTPATIENT
Start: 2025-02-07 | End: 2025-02-08 | Stop reason: HOSPADM

## 2025-02-07 RX ORDER — PANTOPRAZOLE SODIUM 40 MG/1
40 TABLET, DELAYED RELEASE ORAL
Status: DISCONTINUED | OUTPATIENT
Start: 2025-02-07 | End: 2025-02-08 | Stop reason: HOSPADM

## 2025-02-07 RX ORDER — ACETAMINOPHEN 500 MG
1000 TABLET ORAL EVERY 8 HOURS
Status: DISCONTINUED | OUTPATIENT
Start: 2025-02-07 | End: 2025-02-08 | Stop reason: HOSPADM

## 2025-02-07 RX ORDER — SODIUM CHLORIDE 0.9 % (FLUSH) 0.9 %
5-40 SYRINGE (ML) INJECTION PRN
Status: DISCONTINUED | OUTPATIENT
Start: 2025-02-07 | End: 2025-02-08 | Stop reason: HOSPADM

## 2025-02-07 RX ORDER — MODIFIED LANOLIN
OINTMENT (GRAM) TOPICAL PRN
Status: DISCONTINUED | OUTPATIENT
Start: 2025-02-07 | End: 2025-02-08 | Stop reason: HOSPADM

## 2025-02-07 RX ORDER — ONDANSETRON 4 MG/1
4 TABLET, ORALLY DISINTEGRATING ORAL EVERY 6 HOURS PRN
Status: DISCONTINUED | OUTPATIENT
Start: 2025-02-07 | End: 2025-02-08 | Stop reason: HOSPADM

## 2025-02-07 RX ADMIN — ACETAMINOPHEN 1000 MG: 500 TABLET ORAL at 17:28

## 2025-02-07 RX ADMIN — IBUPROFEN 800 MG: 800 TABLET, FILM COATED ORAL at 00:26

## 2025-02-07 RX ADMIN — IBUPROFEN 800 MG: 800 TABLET, FILM COATED ORAL at 20:26

## 2025-02-07 RX ADMIN — IBUPROFEN 800 MG: 800 TABLET, FILM COATED ORAL at 14:47

## 2025-02-07 RX ADMIN — PANTOPRAZOLE SODIUM 40 MG: 40 TABLET, DELAYED RELEASE ORAL at 09:00

## 2025-02-07 RX ADMIN — Medication: at 00:27

## 2025-02-07 RX ADMIN — ACETAMINOPHEN 1000 MG: 500 TABLET ORAL at 08:28

## 2025-02-07 ASSESSMENT — PAIN DESCRIPTION - DESCRIPTORS
DESCRIPTORS: SORE
DESCRIPTORS: DISCOMFORT

## 2025-02-07 ASSESSMENT — PAIN DESCRIPTION - LOCATION
LOCATION: BACK;PERINEUM
LOCATION: PERINEUM

## 2025-02-07 ASSESSMENT — PAIN SCALES - GENERAL
PAINLEVEL_OUTOF10: 5
PAINLEVEL_OUTOF10: 6
PAINLEVEL_OUTOF10: 5
PAINLEVEL_OUTOF10: 1

## 2025-02-07 ASSESSMENT — PAIN DESCRIPTION - ORIENTATION: ORIENTATION: LOWER

## 2025-02-07 ASSESSMENT — PAIN - FUNCTIONAL ASSESSMENT: PAIN_FUNCTIONAL_ASSESSMENT: ACTIVITIES ARE NOT PREVENTED

## 2025-02-07 NOTE — FLOWSHEET NOTE
Pt up to bathroom for 2nd time. Pt voided large amount with no issues and prachi care education was given. Pt ambulated back to bed. U/U firm, with small amount of lochia. Pt has no questions at this time

## 2025-02-07 NOTE — L&D DELIVERY NOTE
Department of Obstetrics and Gynecology  Spontaneous Vaginal Delivery Note      Pt Name: Yajaira Dunne  MRN: [id number] Acct #: [id number]  YOB: 1995  Procedure Performed By: Tiffanie Rey DO        Pre-operative Diagnosis:  Term pregnancy, Induced labor, Single fetus, and Pregnancy complicated by: LGA    Post-operative Diagnosis: Same, delivered.    Procedure:  Spontaneous vaginal delivery, Repair second degree perineal and right labial spontaneous laceration    Surgeon:  Tiffanie Rey DO    Information for the patient's :  Thomas Dunne [[id number]]        Anesthesia:  epidural anesthesia    Estimated blood loss:  250 ml    Specimen:  Placenta not sent to pathology     Complications:  none    Condition:  infant stable to general nursery and mother stable    Details of Procedure:   The patient is a 29 y.o. female at 40w1d   OB History          1    Para   0    Term   0       0    AB   0    Living             SAB   0    IAB   0    Ectopic   0    Molar        Multiple        Live Births                 who was admitted for induction. She received the following interventions: ARBOW and IV Pitocin induction. The patient progressed well,did receive an epidural, became complete and started to push. Patient progressed well and the fetal head was delivered over an intact perineum, no nuchal cord was noted, and the rest of the infant delivered atraumatically. Infant was placed on mother abdomen. Cord was clamped and cut and infant handed off to the waiting nurse for evaluation. The delivery of the placenta was spontaneous and noted intact. The perineum and vagina were explored and a second degree laceration was repaired in standard fashion with 3-0 vicryl.   A right labial laceration was repaired with 3-0 Vicryl in a running fashion.  Sponge, instruments, and needle counts were correct.  Needles were disposed of appropriately.      Delivery Summary:       Tiffanie Rey

## 2025-02-07 NOTE — FLOWSHEET NOTE
02/06/25 1925   Provider Notification   Reason for Communication Status Update   Name of Team Member Notified Tenaesan   Treatment Team Role Attending Provider   Method of Communication Call   Response No new orders

## 2025-02-07 NOTE — LACTATION NOTE
Met with pt to assist with latch.  Pt sore and desires to sit in chair.  Baby opens very narrow.  Educated about assisting him to gape during rest, massaging jaw line for relaxation. Gave colostrum via spoon.  Encouraged to try again in a short while.  Offered support prn.

## 2025-02-07 NOTE — FLOWSHEET NOTE
Patient transported to mother-baby unit via wheelchair in stable post partum condition with baby in arms.

## 2025-02-07 NOTE — PROGRESS NOTES
Department of Obstetrics and Gynecology  Labor and Delivery  Post Partum Day #1      SUBJECTIVE:  Patient feeling well with no complaints. Breastfeeding without difficulty. Mild lochia. Patient denies pain. Urination without difficulty. Positive flatus, but no bowel movement.     OBJECTIVE:/66   Pulse 88   Temp 98.6 °F (37 °C) (Oral)   Resp 16   Ht 1.676 m (5' 6\")   Wt 88.9 kg (196 lb)   SpO2 97%   Breastfeeding Unknown   BMI 31.64 kg/m²    HEENT: normocephalic  Chest: even respirations.   Extremities: warm and dry. No edema    DATA:    Lab Results   Component Value Date/Time    HGB 12.6 02/06/2025 07:47 AM    HGB 13.3 03/30/2012 11:53 AM    HCT 38.5 02/06/2025 07:47 AM       ASSESSMENT & PLAN:    Normal post partum day 1 exam. Plan as per orders.       Nicole Matthew PA-C

## 2025-02-07 NOTE — LACTATION NOTE
Met with pt in room.  Gave local support information, verified they have a breast pump. Shared basics about breastfeeding, frequency, pumping tips, answered questions. Name and number on board for calling out for assistance.  Offered support prn, [facility] and other area entities.

## 2025-02-07 NOTE — FLOWSHEET NOTE
Report received from Nikia PERAZA. Pt transferred to [facility] via wheelchair. Pt oriented to unit and was fully assessed. Pt in stable condition with no questions at this time.

## 2025-02-07 NOTE — FLOWSHEET NOTE
02/07/25 0145   Perineum   Appearance Well-approximated;Swollen   Intervention Ice on;Medicated pads;Medicated spray;Prachi bottle;Pad changed     Assisted up to BR. Voided x1. Assisted with prachi care. Pads and gown changed. Patient to wheelchair for transport to post partum.

## 2025-02-07 NOTE — LACTATION NOTE
Met with pt in room.  Sized for flange.  Baby very sleepy and has not latched and fed well, pt has been giving drops of colostrum by spoon or baby is licking off nipple.  Gave 20mm nipple shield to try for stability on breast.  Baby latched and suckled well on shield with stimulation.  Offered support prn.

## 2025-02-07 NOTE — DISCHARGE SUMMARY
Obstetric Discharge Summary      Pt Name: Yajaira Dunne  MRN: [id number] Acct #: [id number]  YOB: 1995        Admitting Diagnosis  IUP  OB History          1    Para   1    Term   1       0    AB   0    Living   1         SAB   0    IAB   0    Ectopic   0    Molar        Multiple   0    Live Births   1                Reasons for Admission on 2025  7:21 AM  Encounter for induction of labor [Z34.90]  Vaginal Delivery      Postpartum/Operative Complications   None     Data  Information for the patient's :  Thomas Dunne [[id number]]   male   Birth Weight: 4.03 kg (8 lb 14.2 oz)    Discharge Diagnosis  Postpartum, Vaginal Delivery    Discharge Information  Current Discharge Medication List        CONTINUE these medications which have NOT CHANGED    Details   Pantoprazole Sodium (PROTONIX PO) Take by mouth      Omega-3 Fatty Acids (FISH OIL PO) Take by mouth      Probiotic Product (PROBIOTIC PO) Take by mouth      ELDERBERRY PO Take by mouth      Loratadine (CLARITIN PO) Take by mouth      Ascorbic Acid (MILAGROS-C PO) Take by mouth      VITAMIN D PO Take by mouth      Multiple Vitamins-Minerals (THERAPEUTIC MULTIVITAMIN-MINERALS) tablet Take 1 tablet by mouth daily           STOP taking these medications       PROGESTERONE PO Comments:   Reason for Stopping:                   Vaginal Delivery  Diet regular    Condition: Stable  Discharge to:  home  Follow up in 4 weeks    Patient to be discharged on 2025    Nicole Matthew PA-C

## 2025-02-07 NOTE — FLOWSHEET NOTE
02/06/25 5439   Provider Notification   Name of Team Member Notified Pk   Treatment Team Role Attending Provider   Method of Communication Face to face   Response At bedside     Patient continues to push effectively with contractions

## 2025-02-08 VITALS
HEART RATE: 84 BPM | BODY MASS INDEX: 31.5 KG/M2 | HEIGHT: 66 IN | RESPIRATION RATE: 16 BRPM | TEMPERATURE: 98.4 F | OXYGEN SATURATION: 98 % | WEIGHT: 196 LBS | DIASTOLIC BLOOD PRESSURE: 84 MMHG | SYSTOLIC BLOOD PRESSURE: 108 MMHG

## 2025-02-08 PROCEDURE — 6370000000 HC RX 637 (ALT 250 FOR IP): Performed by: OBSTETRICS & GYNECOLOGY

## 2025-02-08 RX ADMIN — IBUPROFEN 800 MG: 800 TABLET, FILM COATED ORAL at 07:47

## 2025-02-08 RX ADMIN — ACETAMINOPHEN 1000 MG: 500 TABLET ORAL at 13:09

## 2025-02-08 RX ADMIN — DOCUSATE SODIUM 100 MG: 100 CAPSULE, LIQUID FILLED ORAL at 07:46

## 2025-02-08 RX ADMIN — ACETAMINOPHEN 1000 MG: 500 TABLET ORAL at 01:30

## 2025-02-08 RX ADMIN — PANTOPRAZOLE SODIUM 40 MG: 40 TABLET, DELAYED RELEASE ORAL at 07:46

## 2025-02-08 ASSESSMENT — PAIN DESCRIPTION - DESCRIPTORS
DESCRIPTORS: SORE
DESCRIPTORS: SORE

## 2025-02-08 ASSESSMENT — PAIN SCALES - GENERAL
PAINLEVEL_OUTOF10: 2
PAINLEVEL_OUTOF10: 5

## 2025-02-08 ASSESSMENT — PAIN DESCRIPTION - ORIENTATION
ORIENTATION: LOWER
ORIENTATION: LOWER

## 2025-02-08 ASSESSMENT — PAIN - FUNCTIONAL ASSESSMENT
PAIN_FUNCTIONAL_ASSESSMENT: ACTIVITIES ARE NOT PREVENTED
PAIN_FUNCTIONAL_ASSESSMENT: ACTIVITIES ARE NOT PREVENTED

## 2025-02-08 ASSESSMENT — PAIN DESCRIPTION - LOCATION
LOCATION: PERINEUM
LOCATION: INCISION

## 2025-02-08 NOTE — DISCHARGE INSTRUCTIONS
Postpartum Discharge Instructions      DIET  Eat a well balanced diet focusing on foods high in fiber and protein.     Drink plenty of fluids especially water.    To avoid constipation you may take a mild stool softener as recommended by your doctor .    ACTIVITY  Gradually increase your activity. Resume exercise regimen only after advise by your doctor.    Avoid lifting anything heavier than your baby or a gallon of milk for 2 weeks.     Avoid driving until your doctor or midwife has given their approval.    Rise slowly from a lying to sitting and then a standing position.    Climb stairs one at a time. Use caution when carrying your baby up and down the stairs.    NO SEXUAL Activity for 4-6 weeks or until advised by your doctor; Nothing in vagina: intercourse, tampons, or douching.     Be prepared to discuss family planning at your follow-up OB visit.     You may feel tired or have a lack of energy. You may continue your prenatal vitamin to replenish nutrients post delivery. Nap when baby naps to catch up on sleep.     EMOTIONS  You may feed fink, sad, teary, & overwhelmed. Contact your OB provider if you feel you may be showing signs of postpartum depression, or have thoughts of harming yourself or your infant.    If infant will not stop crying, contact another adult for help or place infant in their crib on their back and take a break. NEVER shake your infant.     BLEEDING  Vaginal bleeding will decrease in amount over the next few weeks.    You will notice that as your activity increases, your flow may increase. This is your body's way of telling you, you need take things easier and rest more often.  Call your OB/ER if you are saturating more than one maxi pad in an hour & resting does help.    BREAST CARE  Take medications as recommended by your doctor or midwife for pain    If you develop a warm, red, tender area on your breast or develop a fever contact your OB provider.     For breastfeeding moms:  If

## 2025-02-08 NOTE — PROGRESS NOTES
Post birth warning signs education paper given and reviewed, teaching complete. [place] postpartum depression screening discussed with patient, instructed to contact her healthcare provider if her score is > 10. Patient voiced understanding.  Mother's blood type is A+

## 2025-02-08 NOTE — PLAN OF CARE
Problem: Pain  Goal: Verbalizes/displays adequate comfort level or baseline comfort level  2025 by Bal Marie RN  Outcome: Progressing  Flowsheets  Taken 2025  Verbalizes/displays adequate comfort level or baseline comfort level:   Encourage patient to monitor pain and request assistance   Assess pain using appropriate pain scale  Taken 2025  Verbalizes/displays adequate comfort level or baseline comfort level: Encourage patient to monitor pain and request assistance     Problem: Postpartum  Goal: Experiences normal postpartum course  Description:  Postpartum OB-Pregnancy care plan goal which identifies if the mother is experiencing a normal postpartum course  2025 by Bal Marie RN  Outcome: Progressing  Flowsheets  Taken 2025  Experiences Normal Postpartum Course: Monitor maternal vital signs  Taken 2025  Experiences Normal Postpartum Course: Monitor maternal vital signs     Problem: Postpartum  Goal: Establishment of infant feeding pattern  Description:  Postpartum OB-Pregnancy care plan goal which identifies if the mother is establishing a feeding pattern with their   2025 by Bal Marie RN  Outcome: Progressing  Flowsheets  Taken 2025  Establishment of Infant Feeding Pattern: Assess breast/bottle feeding  Taken 2025  Establishment of Infant Feeding Pattern: Assess breast/bottle feeding     Problem: Infection - Adult  Goal: Absence of infection at discharge  2025 by Bal Marie RN  Outcome: Progressing  Flowsheets  Taken 2025  Absence of infection at discharge:   Assess and monitor for signs and symptoms of infection   Monitor lab/diagnostic results  Taken 2025  Absence of infection at discharge: Assess and monitor for signs and symptoms of infection     Problem: Infection - Adult  Goal: Absence of infection during hospitalization  2025 by Bal Marie, 
  Problem: Pain  Goal: Verbalizes/displays adequate comfort level or baseline comfort level  2025 by Elisha Monroe RN  Outcome: Completed  2025 by Bal Marie RN  Outcome: Progressing  Flowsheets  Taken 2025  Verbalizes/displays adequate comfort level or baseline comfort level:   Encourage patient to monitor pain and request assistance   Assess pain using appropriate pain scale  Taken 2025  Verbalizes/displays adequate comfort level or baseline comfort level: Encourage patient to monitor pain and request assistance     Problem: Postpartum  Goal: Experiences normal postpartum course  Description:  Postpartum OB-Pregnancy care plan goal which identifies if the mother is experiencing a normal postpartum course  2025 by Elisha Monroe RN  Outcome: Completed  2025 by Bal Marie RN  Outcome: Progressing  Flowsheets  Taken 2025  Experiences Normal Postpartum Course: Monitor maternal vital signs  Taken 2025  Experiences Normal Postpartum Course: Monitor maternal vital signs  Goal: Establishment of infant feeding pattern  Description:  Postpartum OB-Pregnancy care plan goal which identifies if the mother is establishing a feeding pattern with their   2025 by Elisha Monroe RN  Outcome: Completed  2025 by Bal Marie RN  Outcome: Progressing  Flowsheets  Taken 2025  Establishment of Infant Feeding Pattern: Assess breast/bottle feeding  Taken 2025  Establishment of Infant Feeding Pattern: Assess breast/bottle feeding     Problem: Infection - Adult  Goal: Absence of infection at discharge  2025 by Elisha Monroe RN  Outcome: Completed  2025 by Bal Marie RN  Outcome: Progressing  Flowsheets  Taken 2025  Absence of infection at discharge:   Assess and monitor for signs and symptoms of infection   Monitor lab/diagnostic results  Taken 2025  Absence of 
  Problem: Postpartum  Goal: Experiences normal postpartum course  Description:  Postpartum OB-Pregnancy care plan goal which identifies if the mother is experiencing a normal postpartum course  Outcome: Progressing  Flowsheets (Taken 2025)  Experiences Normal Postpartum Course:   Monitor maternal vital signs   Assess uterine involution  Goal: Appropriate maternal -  bonding  Description:  Postpartum OB-Pregnancy care plan goal which identifies if the mother and  are bonding appropriately  Outcome: Progressing  Goal: Establishment of infant feeding pattern  Description:  Postpartum OB-Pregnancy care plan goal which identifies if the mother is establishing a feeding pattern with their   Outcome: Progressing  Goal: Incisions, wounds, or drain sites healing without S/S of infection  Outcome: Progressing     Problem: Pain  Goal: Verbalizes/displays adequate comfort level or baseline comfort level  Outcome: Progressing  Flowsheets (Taken 2025)  Verbalizes/displays adequate comfort level or baseline comfort level:   Encourage patient to monitor pain and request assistance   Assess pain using appropriate pain scale   Administer analgesics based on type and severity of pain and evaluate response   Implement non-pharmacological measures as appropriate and evaluate response   Consider cultural and social influences on pain and pain management   Notify Licensed Independent Practitioner if interventions unsuccessful or patient reports new pain     Problem: Infection - Adult  Goal: Absence of infection at discharge  Outcome: Progressing  Flowsheets (Taken 2025)  Absence of infection at discharge:   Assess and monitor for signs and symptoms of infection   Monitor lab/diagnostic results   Monitor all insertion sites i.e., indwelling lines, tubes and drains   Instruct and encourage patient and family to use good hand hygiene technique   Administer medications as ordered   Identify and 
  Problem: Postpartum  Goal: Experiences normal postpartum course  Description:  Postpartum OB-Pregnancy care plan goal which identifies if the mother is experiencing a normal postpartum course  Recent Flowsheet Documentation  Taken 2025 by Gaby Queen RN  Experiences Normal Postpartum Course: Monitor maternal vital signs  Taken 2025 by Sailaja Holder RN  Experiences Normal Postpartum Course: Monitor maternal vital signs     Problem: Postpartum  Goal: Establishment of infant feeding pattern  Description:  Postpartum OB-Pregnancy care plan goal which identifies if the mother is establishing a feeding pattern with their   Recent Flowsheet Documentation  Taken 2025 by Gaby Queen RN  Establishment of Infant Feeding Pattern: Assess breast/bottle feeding  Taken 2025 by Sailaja Holder RN  Establishment of Infant Feeding Pattern: Assess breast/bottle feeding     Problem: Postpartum  Goal: Incisions, wounds, or drain sites healing without S/S of infection  Recent Flowsheet Documentation  Taken 2025 by Sailaja Holder RN  Incisions, Wounds, or Drain Sites Healing Without Sign and Symptoms of Infection: TWICE DAILY: Assess and document dressing/incision, wound bed, drain sites and surrounding tissue     Problem: Pain  Goal: Verbalizes/displays adequate comfort level or baseline comfort level  Recent Flowsheet Documentation  Taken 2025 by Gaby Queen RN  Verbalizes/displays adequate comfort level or baseline comfort level: Encourage patient to monitor pain and request assistance  Taken 2025 by Sailaja Holder RN  Verbalizes/displays adequate comfort level or baseline comfort level: Encourage patient to monitor pain and request assistance     Problem: Infection - Adult  Goal: Absence of infection at discharge  Recent Flowsheet Documentation  Taken 2025 by Gaby Queen RN  Absence of infection at discharge: Assess and monitor for signs and 
  Problem: Vaginal Birth or  Section  Goal: Fetal and maternal status remain reassuring during the birth process  Description:  Birth OB-Pregnancy care plan goal which identifies if the fetal and maternal status remain reassuring during the birth process  2025 by Nikia Fuller RN  Outcome: Progressing  Flowsheets (Taken 2025 by Dona Greene, RN)  Fetal and Maternal Status Remain Reassuring During the Birth Process:   Monitor vital signs   Monitor uterine activity   Monitor labor progression (Vaginal delivery)   Monitor fetal heart rate  Note: Patient remains on EFM.     Problem: Pain  Goal: Verbalizes/displays adequate comfort level or baseline comfort level  2025 by Nikia Fuller RN  Outcome: Progressing  Flowsheets (Taken 2025 by Dona Greene, RN)  Verbalizes/displays adequate comfort level or baseline comfort level:   Encourage patient to monitor pain and request assistance   Assess pain using appropriate pain scale   Administer analgesics based on type and severity of pain and evaluate response   Implement non-pharmacological measures as appropriate and evaluate response   Consider cultural and social influences on pain and pain management   Notify Licensed Independent Practitioner if interventions unsuccessful or patient reports new pain  Note: Patient denies pain. Epidural in place.     Problem: Infection - Adult  Goal: Absence of infection at discharge  2025 by Nikia Fuller RN  Outcome: Progressing  Flowsheets (Taken 2025 by Dona Greene, RN)  Absence of infection at discharge:   Assess and monitor for signs and symptoms of infection   Monitor lab/diagnostic results   Monitor all insertion sites i.e., indwelling lines, tubes and drains   Instruct and encourage patient and family to use good hand hygiene technique   Administer medications as ordered   Identify and instruct in appropriate isolation precautions for 
ordered   Instruct and encourage patient and family to use good hand hygiene technique   Identify and instruct in appropriate isolation precautions for identified infection/condition  Note: Vital signs and assessments WNL.       Problem: Safety - Adult  Goal: Free from fall injury  Outcome: Progressing  Flowsheets (Taken 2/7/2025 0220)  Free From Fall Injury: Instruct family/caregiver on patient safety  Note: No falls.     Problem: Discharge Planning  Goal: Discharge to home or other facility with appropriate resources  Outcome: Progressing  Flowsheets (Taken 2/7/2025 0220)  Discharge to home or other facility with appropriate resources: Identify barriers to discharge with patient and caregiver  Note: Discharge plan discussed    Care plan reviewed with patient and she contributes to goal setting and voices understanding of plan of care.

## 2025-02-08 NOTE — ANESTHESIA POSTPROCEDURE EVALUATION
Department of Anesthesiology  Postprocedure Note    Patient: Yajaira Dunne  MRN: [id number]  YOB: 1995  Date of evaluation: 2/8/2025    Procedure Summary       Date: 02/06/25 Room / Location:     Anesthesia Start: 1615 Anesthesia Stop: 2332    Procedure: Labor Analgesia Diagnosis:     Scheduled Providers:  Responsible Provider: George Shultz MD    Anesthesia Type: epidural ASA Status: 2            Anesthesia Type: No value filed.    Padmini Phase I: Padmini Score: 10    Padmini Phase II: Padmini Score: 10    Anesthesia Post Evaluation    Patient location during evaluation: floor  Patient participation: complete - patient participated  Level of consciousness: awake and alert  Pain score: 0  Airway patency: patent  Nausea & Vomiting: no nausea and no vomiting  Cardiovascular status: blood pressure returned to baseline and hemodynamically stable  Respiratory status: acceptable, nonlabored ventilation and room air  Hydration status: stable  Comments: No complaints from epidural site amb without issues vss  Pain management: satisfactory to patient        No notable events documented.

## (undated) DEVICE — EXCEL 10FT (3.05 M) INSUFFLATION TUBING SET WITH 0.1 MICRON FILTER: Brand: EXCEL

## (undated) DEVICE — GAUZE,SPONGE,8"X4",12PLY,XRAY,STRL,LF: Brand: MEDLINE

## (undated) DEVICE — SPONGE GZ W4XL4IN COT 12 PLY TYP VII WVN C FLD DSGN

## (undated) DEVICE — NON COATED ELECTROSURGICAL NEEDLE ELECTRODE, 2.75 INCH (7 CM): Brand: MEGADYNE

## (undated) DEVICE — INTENDED FOR TISSUE SEPARATION, AND OTHER PROCEDURES THAT REQUIRE A SHARP SURGICAL BLADE TO PUNCTURE OR CUT.: Brand: BARD-PARKER ® CARBON RIB-BACK BLADES

## (undated) DEVICE — Z INACTIVE USE 2735373 APPLICATOR FBR LAIN COT WOOD TIP ECONOMICAL

## (undated) DEVICE — TROCAR: Brand: KII FIOS FIRST ENTRY

## (undated) DEVICE — CANNULA NSL AD TBNG L7FT UNIV 2 M LUER TWO END TDL CO2 SAMP

## (undated) DEVICE — TROCAR: Brand: KII® SLEEVE

## (undated) DEVICE — PAD,ABDOMINAL,5"X9",ST,LF,25/BX: Brand: MEDLINE INDUSTRIES, INC.

## (undated) DEVICE — GAUZE,PACKING STRIP,IODOFORM,1/2"X5YD,ST: Brand: CURAD

## (undated) DEVICE — BLADE CLIPPER GEN PURP NS

## (undated) DEVICE — 35 ML SYRINGE LUER-LOCK TIP: Brand: MONOJECT

## (undated) DEVICE — STRIP,CLOSURE,WOUND,MEDI-STRIP,1/2X4: Brand: MEDLINE

## (undated) DEVICE — SOLUTION IV 1000ML 0.9% SOD CHL PH 5 INJ USP VIAFLX PLAS

## (undated) DEVICE — SURE SET SINGLE BASIN-LF: Brand: MEDLINE INDUSTRIES, INC.

## (undated) DEVICE — PACK,SET UP,NO DRAPES: Brand: MEDLINE

## (undated) DEVICE — TOWEL,OR,DSP,ST,BLUE,DLX,4/PK,20PK/CS: Brand: MEDLINE

## (undated) DEVICE — NEEDLE SYR 18GA L1.5IN RED PLAS HUB S STL BLNT FILL W/O

## (undated) DEVICE — COUNTER NDL 40 COUNT HLD 70 FOAM BLK ADH W/ MAG

## (undated) DEVICE — SYRINGE MED 10ML LUERLOCK TIP W/O SFTY DISP

## (undated) DEVICE — GENERAL LAPAROSCOPY PACK-LF: Brand: MEDLINE INDUSTRIES, INC.

## (undated) DEVICE — GLOVE SURG SZ 65 THK91MIL LTX FREE SYN POLYISOPRENE

## (undated) DEVICE — 3 ML SYRINGE LUER-LOCK TIP: Brand: MONOJECT

## (undated) DEVICE — PAD,NON-ADHERENT,3X8,STERILE,LF,1/PK: Brand: MEDLINE

## (undated) DEVICE — UNIVERSAL PLUS MULTIFUNCTION INSTRUMENT SYSTEM (STRAIGHT GRIP HANDLE), STRAIGHT HANDLE ELECTROSURGICAL SUCTION/IRRIGATION INSTRUMENT WITH HAND CONTROLLED ELECTROSURGERY (BUTTONS): Brand: UNIVERSAL PLUS

## (undated) DEVICE — COVER ARMBRD W13XL28.5IN IMPERV BLU FOR OP RM

## (undated) DEVICE — HYPODERMIC SAFETY NEEDLE: Brand: MAGELLAN

## (undated) DEVICE — TROCAR ENDOSCP L100MM DIA5MM BLDELSS STBL SL THRD OPT VW

## (undated) DEVICE — SHEET, T, LAPAROTOMY, STERILE: Brand: MEDLINE

## (undated) DEVICE — DRAPE,UNDERBUTTOCKS,PCH,STERILE: Brand: MEDLINE

## (undated) DEVICE — Z DISCONTINUED BY MEDLINE USE 2711682 TRAY SKIN PREP DRY W/ PREM GLV

## (undated) DEVICE — PACK PROC LAP II AURORA

## (undated) DEVICE — 3M™ WARMING BLANKET, UPPER BODY, 10 PER CASE, 42268: Brand: BAIR HUGGER™

## (undated) DEVICE — GLOVE ORANGE PI 7 1/2   MSG9075

## (undated) DEVICE — GLOVE SURG SZ 75 L12IN FNGR THK94MIL STD WHT ISOLEX LTX

## (undated) DEVICE — UNIVERSAL PLUS LAPAROSCOPIC ELECTRODE WITH SUCTION/IRRIGATION, SPATULA 5 MM X 32 CM: Brand: UNIVERSAL PLUS

## (undated) DEVICE — YANKAUER,BULB TIP,W/O VENT,RIGID,STERILE: Brand: MEDLINE

## (undated) DEVICE — 3M™ STERI-STRIP™ COMPOUND BENZOIN TINCTURE 40 BAGS/CARTON 4 CARTONS/CASE C1544: Brand: 3M™ STERI-STRIP™

## (undated) DEVICE — APPLIER CLP M L L11.4IN DIA10MM ENDOSCP ROT MULT FOR LIG

## (undated) DEVICE — GOWN,SIRUS,NONRNF,SETINSLV,XL,20/CS: Brand: MEDLINE

## (undated) DEVICE — GLOVE ORANGE PI 8   MSG9080

## (undated) DEVICE — GLOVE ORANGE PI 7   MSG9070

## (undated) DEVICE — SYRINGE IRRIG 60ML SFT PLIABLE BLB EZ TO GRP 1 HND USE W/

## (undated) DEVICE — SOLUTION SCRB 4OZ 4% CHG H2O AIDED FOR PREOPERATIVE SKIN

## (undated) DEVICE — ELECTROSURGICAL PENCIL BUTTON SWITCH E-Z CLEAN COATED BLADE ELECTRODE 10 FT (3 M) CORD HOLSTER: Brand: MEGADYNE

## (undated) DEVICE — KIT,ANTI FOG,W/SPONGE & FLUID,SOFT PACK: Brand: MEDLINE

## (undated) DEVICE — BAG 3X6 DETACH NYL SPEC

## (undated) DEVICE — TUBING, SUCTION, 1/4" X 12', STRAIGHT: Brand: MEDLINE

## (undated) DEVICE — PAD,SANITARY,11 IN,MAXI,W/WINGS,N-STRL: Brand: MEDLINE

## (undated) DEVICE — GARMENT,MEDLINE,DVT,INT,CALF,MED, GEN2: Brand: MEDLINE

## (undated) DEVICE — Y-TYPE TUR/BLADDER IRRIGATION SET, REGULATING CLAMP

## (undated) DEVICE — GOWN,SIRUS,NON REINFRCD,LARGE,SET IN SL: Brand: MEDLINE

## (undated) DEVICE — TROCAR ENDOSCP L100MM DIA11MM STBL SL BLDELSS DISP ENDOPATH

## (undated) DEVICE — CANISTER, RIGID, 2000CC: Brand: MEDLINE INDUSTRIES, INC.